# Patient Record
Sex: FEMALE | Race: BLACK OR AFRICAN AMERICAN | NOT HISPANIC OR LATINO | Employment: OTHER | URBAN - METROPOLITAN AREA
[De-identification: names, ages, dates, MRNs, and addresses within clinical notes are randomized per-mention and may not be internally consistent; named-entity substitution may affect disease eponyms.]

---

## 2023-01-27 ENCOUNTER — APPOINTMENT (OUTPATIENT)
Dept: PREADMISSION TESTING | Facility: HOSPITAL | Age: 63
End: 2023-01-27
Payer: COMMERCIAL

## 2023-01-27 VITALS — HEIGHT: 62 IN | WEIGHT: 148 LBS | BODY MASS INDEX: 27.23 KG/M2

## 2023-01-27 RX ORDER — AMLODIPINE BESYLATE 10 MG/1
10 TABLET ORAL DAILY
COMMUNITY
Start: 2022-12-16

## 2023-01-27 RX ORDER — BUDESONIDE AND FORMOTEROL FUMARATE DIHYDRATE 160; 4.5 UG/1; UG/1
2 AEROSOL RESPIRATORY (INHALATION) AS NEEDED
COMMUNITY
Start: 2022-05-13 | End: 2023-02-24 | Stop reason: ALTCHOICE

## 2023-01-27 RX ORDER — OXYCODONE AND ACETAMINOPHEN 5; 325 MG/1; MG/1
TABLET ORAL
COMMUNITY
Start: 2023-01-16 | End: 2023-03-11 | Stop reason: ENTERED-IN-ERROR

## 2023-01-27 RX ORDER — ATORVASTATIN CALCIUM 40 MG/1
TABLET, FILM COATED ORAL
COMMUNITY
Start: 2022-02-01 | End: 2023-02-24 | Stop reason: ALTCHOICE

## 2023-01-27 RX ORDER — SERTRALINE HYDROCHLORIDE 100 MG/1
100 TABLET, FILM COATED ORAL DAILY
COMMUNITY
End: 2024-01-02

## 2023-01-27 NOTE — PRE-PROCEDURE INSTRUCTIONS
1. We will call you between 2pm and 5pm on 2/6/23 to inform you of arrival time for your procedure. If you do not hear by 6PM, please call [phone] for arrival time.     2. Please arrive through the Main Entr[facility] of the [facility] (across from the parking garage) and report to the Surgery Registration Desk on the day of your procedure.    3. Please follow the following fasting guidelines:     No solid food EIGHT HOURS prior to surgery.  Unlimited clear liquids, meaning water or PLAIN black coffee WITHOUT any milk, cream, sugar, or sweetener are permitted up to TWO HOURS prior to arrival at the hospital.    4. Early on the morning of the procedure please take your usual dose of the listed medications with a sip of water:  Amlodipine  Atorvastatin  zoloft    Stop vitamins/supplements 1 week prior to surgery.    Avoid anti-inflammatories and aspirin products 1 week prior to surgery.  Tylenol is ok.    5. Other Instructions: You may brush your teeth the morning of the procedure. Rinse and spit, do not swallow.  Bring a list of your medications with dosages.  Use surgical wash as directed.     6. If you develop a cold, cough, fever, rash, or other symptom prior to the day of the procedure, please report it to your physician immediately.    7. If you need to cancel the procedure for any reason, please contact your physician.    8. Make arrangements to have someone drive you home from the procedure. If you have not arranged for transportation home, your surgery may be cancelled.     9. You may not take public transportation unless accompanied by a responsible person.    10. You may not drive a car or operate complex or potentially dangerous machinery for 24 hours following anesthesia and/or sedation.    11.  If it is medically necessary for you to have a longer stay, you will be informed as soon as the decision is made.    12. Only bring essential items to the hospital.  Do not wear or bring anything of value to the  hospital including jewelry of any kind, money, or wallet. Do not wear make-up or contact lenses.  DO NOT BRING MEDICATIONS FROM HOME unless instructed to do so. DO bring your hearing aids, glasses, and a case    13. No lotion, creams, powders, or oils on skin the morning of procedure     14. Dress in comfortable clothes.    15.  If instructed, please bring a copy of your Advanced Directive (Living Will/Durable Power of ) on the day of your procedure.     16. Patients need to quarantine from the time of PAT COVID test to day of surgery, regardless of COVID vaccine status.      17. Ensuring your safety at all times is a very important part of our [facility] Culture of Safety. After having surgery and sedation, you are at risk for falling and balance issues. Although you may feel awake, the effects of the medication can last up to 24 hours after anesthesia. If you need to use the bathroom during your recovery period, nursing staff will escort you there and stay with you to ensure your safety.    18. Refrain from drinking alcohol and smoking cigarettes for 24 hours prior to surgery.    19. Shower with antibacterial soap (Dial) the night before and morning of your procedure.  If your procedure indicates the need for CHG antiseptic wash (Bactoshield or Hibiclens), please use this instead and follow instructions as discussed at the time of your Pre-Admission Testing phone interview or visit.    Above instructions reviewed with patient and patient acknowledges understanding.  Fasting guidelines repeated x3 and read back by patient.  Pt verbalized understanding of stated guidelines.       Form explained by: Elyssa Royal RN

## 2023-02-02 ENCOUNTER — APPOINTMENT (OUTPATIENT)
Dept: PREADMISSION TESTING | Facility: HOSPITAL | Age: 63
End: 2023-02-02
Attending: SPECIALIST
Payer: COMMERCIAL

## 2023-02-02 ENCOUNTER — TRANSCRIBE ORDERS (OUTPATIENT)
Dept: REGISTRATION | Facility: HOSPITAL | Age: 63
End: 2023-02-02

## 2023-02-02 DIAGNOSIS — M51.16 INTERVERTEBRAL DISC DISORDERS WITH RADICULOPATHY, LUMBAR REGION: ICD-10-CM

## 2023-02-02 DIAGNOSIS — M51.16 INTERVERTEBRAL DISC DISORDERS WITH RADICULOPATHY, LUMBAR REGION: Primary | ICD-10-CM

## 2023-02-02 LAB — SARS-COV-2 RNA RESP QL NAA+PROBE: NEGATIVE

## 2023-02-02 PROCEDURE — U0003 INFECTIOUS AGENT DETECTION BY NUCLEIC ACID (DNA OR RNA); SEVERE ACUTE RESPIRATORY SYNDROME CORONAVIRUS 2 (SARS-COV-2) (CORONAVIRUS DISEASE [COVID-19]), AMPLIFIED PROBE TECHNIQUE, MAKING USE OF HIGH THROUGHPUT TECHNOLOGIES AS DESCRIBED BY CMS-2020-01-R: HCPCS

## 2023-02-02 PROCEDURE — C9803 HOPD COVID-19 SPEC COLLECT: HCPCS

## 2023-02-06 RX ORDER — CEFAZOLIN SODIUM/WATER 2 G/20 ML
2 SYRINGE (ML) INTRAVENOUS ONCE
Status: CANCELLED | OUTPATIENT
Start: 2023-02-06 | End: 2023-02-06

## 2023-02-06 RX ORDER — ACETAMINOPHEN 325 MG/1
975 TABLET ORAL ONCE
Status: CANCELLED | OUTPATIENT
Start: 2023-02-06 | End: 2023-02-06

## 2023-02-24 ENCOUNTER — APPOINTMENT (OUTPATIENT)
Dept: PREADMISSION TESTING | Facility: HOSPITAL | Age: 63
End: 2023-02-24
Payer: COMMERCIAL

## 2023-02-24 VITALS — HEIGHT: 60 IN | BODY MASS INDEX: 28.9 KG/M2

## 2023-02-24 RX ORDER — ROSUVASTATIN CALCIUM 40 MG/1
40 TABLET, COATED ORAL DAILY
COMMUNITY

## 2023-02-24 NOTE — PRE-PROCEDURE INSTRUCTIONS
1. We will call you between 3 pm and 7 pm on March 8th to inform you of arrival time for your procedure. If you do not hear by 6PM, please call [phone] arrival time.     2. Please arrive through the Main Entrance of the [facility] (across from the parking garage) and report to the Surgery Registration Desk on the day of your procedure.    3. Please follow the following fasting guidelines:     No solid food EIGHT HOURS prior to surgery.  Unlimited clear liquids, meaning water or PLAIN black coffee WITHOUT any milk, cream, sugar, or sweetener are permitted up to TWO HOURS prior to arrival at the hospital.    4. Early on the morning of the procedure please take your usual dose of the listed medications with a sip of water:    Amlodipine, Rosuvastatin, Sertraline      Stop all over the counter supplements, vitamins and Non-steriodal medications    Other Instructions: Do not take any mints, gum, hard candy, cough drops  or lozenges the morning of your procedure    5. Other Instructions: You may brush your teeth the morning of the procedure. Rinse and spit, do not swallow.  Bring a list of your medications with dosages.  Use surgical wash as directed.     6. If you develop a cold, cough, fever, rash, or other symptom prior to the day of the procedure, please report it to your physician immediately.    7. If you need to cancel the procedure for any reason, please contact your physician.    8. Make arrangements to have someone drive you home from the procedure. If you have not arranged for transportation home, your surgery may be cancelled.     9. You may not take public transportation unless accompanied by a responsible person.    10. You may not drive a car or operate complex or potentially dangerous machinery for 24 hours following anesthesia and/or sedation.    11.  If it is medically necessary for you to have a longer stay, you will be informed as soon as the decision is made.    12. Only bring essential items to  the hospital.  Do not wear or bring anything of value to the hospital including jewelry of any kind, money, or wallet. Do not wear make-up or contact lenses.  DO NOT BRING MEDICATIONS FROM HOME unless instructed to do so. DO bring your hearing aids, glasses, and a case    13. No lotion, creams, powders, or oils on skin the morning of procedure     14. Dress in comfortable clothes.    15.  If instructed, please bring a copy of your Advanced Directive (Living Will/Durable Power of ) on the day of your procedure.     16. Patients need to quarantine from the time of PAT COVID test to day of surgery, regardless of COVID vaccine status.      17. Ensuring your safety at all times is a very important part of our [facility] Culture of Safety. After having surgery and sedation, you are at risk for falling and balance issues. Although you may feel awake, the effects of the medication can last up to 24 hours after anesthesia. If you need to use the bathroom during your recovery period, nursing staff will escort you there and stay with you to ensure your safety.    18. Refrain from drinking alcohol and smoking cigarettes for 24 hours prior to surgery.    19. Shower with antibacterial soap (Dial) the night before and morning of your procedure.  If your procedure indicates the need for CHG antiseptic wash (Bactoshield or Hibiclens), please use this instead and follow instructions as discussed at the time of your Pre-Admission Testing phone interview or visit.    Above instructions reviewed with patient and patient acknowledges understanding.    Form explained by: My Hawkins RN

## 2023-03-06 ENCOUNTER — TRANSCRIBE ORDERS (OUTPATIENT)
Dept: REGISTRATION | Facility: HOSPITAL | Age: 63
End: 2023-03-06

## 2023-03-06 ENCOUNTER — APPOINTMENT (OUTPATIENT)
Dept: PREADMISSION TESTING | Facility: HOSPITAL | Age: 63
End: 2023-03-06
Attending: SPECIALIST
Payer: COMMERCIAL

## 2023-03-06 ENCOUNTER — APPOINTMENT (OUTPATIENT)
Dept: LAB | Facility: HOSPITAL | Age: 63
End: 2023-03-06
Attending: SPECIALIST
Payer: COMMERCIAL

## 2023-03-06 DIAGNOSIS — M51.16 INTERVERTEBRAL DISC DISORDERS WITH RADICULOPATHY, LUMBAR REGION: Primary | ICD-10-CM

## 2023-03-06 DIAGNOSIS — M51.16 INTERVERTEBRAL DISC DISORDERS WITH RADICULOPATHY, LUMBAR REGION: ICD-10-CM

## 2023-03-06 LAB
ALBUMIN SERPL-MCNC: 3.7 G/DL (ref 3.4–5)
ALP SERPL-CCNC: 72 IU/L (ref 35–126)
ALT SERPL-CCNC: 17 IU/L (ref 11–54)
ANION GAP SERPL CALC-SCNC: 8 MEQ/L (ref 3–15)
AST SERPL-CCNC: 20 IU/L (ref 15–41)
BILIRUB SERPL-MCNC: 0.8 MG/DL (ref 0.3–1.2)
BUN SERPL-MCNC: 9 MG/DL (ref 8–20)
CALCIUM SERPL-MCNC: 8.7 MG/DL (ref 8.9–10.3)
CHLORIDE SERPL-SCNC: 108 MEQ/L (ref 98–109)
CO2 SERPL-SCNC: 24 MEQ/L (ref 22–32)
CREAT SERPL-MCNC: 0.7 MG/DL (ref 0.6–1.1)
ERYTHROCYTE [DISTWIDTH] IN BLOOD BY AUTOMATED COUNT: 14.8 % (ref 11.7–14.4)
GFR SERPL CREATININE-BSD FRML MDRD: >60 ML/MIN/1.73M*2
GLUCOSE SERPL-MCNC: 85 MG/DL (ref 70–99)
HCT VFR BLDCO AUTO: 37.9 % (ref 35–45)
HGB BLD-MCNC: 12.2 G/DL (ref 11.8–15.7)
MCH RBC QN AUTO: 28.5 PG (ref 28–33.2)
MCHC RBC AUTO-ENTMCNC: 32.2 G/DL (ref 32.2–35.5)
MCV RBC AUTO: 88.6 FL (ref 83–98)
PDW BLD AUTO: 11.8 FL (ref 9.4–12.3)
PLATELET # BLD AUTO: 294 K/UL (ref 150–369)
POTASSIUM SERPL-SCNC: 4.4 MEQ/L (ref 3.6–5.1)
PROT SERPL-MCNC: 6.3 G/DL (ref 6–8.2)
RBC # BLD AUTO: 4.28 M/UL (ref 3.93–5.22)
SARS-COV-2 RNA RESP QL NAA+PROBE: NEGATIVE
SODIUM SERPL-SCNC: 140 MEQ/L (ref 136–144)
WBC # BLD AUTO: 4.5 K/UL (ref 3.8–10.5)

## 2023-03-06 PROCEDURE — 85027 COMPLETE CBC AUTOMATED: CPT

## 2023-03-06 PROCEDURE — U0003 INFECTIOUS AGENT DETECTION BY NUCLEIC ACID (DNA OR RNA); SEVERE ACUTE RESPIRATORY SYNDROME CORONAVIRUS 2 (SARS-COV-2) (CORONAVIRUS DISEASE [COVID-19]), AMPLIFIED PROBE TECHNIQUE, MAKING USE OF HIGH THROUGHPUT TECHNOLOGIES AS DESCRIBED BY CMS-2020-01-R: HCPCS

## 2023-03-06 PROCEDURE — C9803 HOPD COVID-19 SPEC COLLECT: HCPCS

## 2023-03-06 PROCEDURE — 80053 COMPREHEN METABOLIC PANEL: CPT

## 2023-03-06 PROCEDURE — 36415 COLL VENOUS BLD VENIPUNCTURE: CPT

## 2023-03-07 ENCOUNTER — ANESTHESIA EVENT (OUTPATIENT)
Dept: OPERATING ROOM | Facility: HOSPITAL | Age: 63
Setting detail: HOSPITAL OUTPATIENT SURGERY
End: 2023-03-07
Payer: COMMERCIAL

## 2023-03-07 NOTE — ANESTHESIOLOGIST PRE-PROCEDURE CHART REVIEW
I confirm that I have reviewed the available information in the medical record prior to the scheduled surgery. Clearance note reviewed.

## 2023-03-08 NOTE — ANESTHESIA PREPROCEDURE EVALUATION
PMH:   Past Medical History:   Diagnosis Date   • Anxiety    • Back pain    • COVID-19    • Depression    • Hypertension    • Lipid disorder    • Panic attacks         ROS/Med Hx:  HTN, HL  Depression  Anxiety  Current smoker  Back pain  Paresthesias radiating to bilateral LE  NPO >8 hours  >4 METS    EKG: NSR        PSH:   Past Surgical History:   Procedure Laterality Date   •  SECTION     • EPIDURAL STEROID INJECTION INTERLAMINAR THORACIC     • FRACTURE SURGERY Left     ankle ORIF   • ROTATOR CUFF REPAIR         Allergies:   Allergies   Allergen Reactions   • Trazodone    • Tree Nut        No current facility-administered medications on file prior to encounter.     No current outpatient medications on file prior to encounter.         CBC Results       23     1106    WBC 4.50    RBC 4.28    HGB 12.2    HCT 37.9    MCV 88.6    MCH 28.5    MCHC 32.2            BMP Results       23     1106        K 4.4    Cl 108    CO2 24    Glucose 85    BUN 9    Creatinine 0.7    Calcium 8.7    Anion Gap 8    EGFR >60.0                  Physical Exam    Airway   Mallampati: II   TM distance: >3 FB   Neck ROM: full  Cardiovascular - normal   Rhythm: regular   Rate: normalPulmonary - normal   clear to auscultation  Dental    Teeth Problems: partials        Anesthesia Plan    Plan: general    Technique: general endotracheal   2 ASA  Anesthetic plan and risks discussed with: patient

## 2023-03-09 ENCOUNTER — APPOINTMENT (OUTPATIENT)
Dept: RADIOLOGY | Facility: HOSPITAL | Age: 63
Setting detail: HOSPITAL OUTPATIENT SURGERY
End: 2023-03-09
Attending: SPECIALIST
Payer: COMMERCIAL

## 2023-03-09 ENCOUNTER — HOSPITAL ENCOUNTER (OUTPATIENT)
Facility: HOSPITAL | Age: 63
Setting detail: HOSPITAL OUTPATIENT SURGERY
Discharge: HOME | End: 2023-03-09
Attending: SPECIALIST | Admitting: SPECIALIST
Payer: COMMERCIAL

## 2023-03-09 ENCOUNTER — ANESTHESIA (OUTPATIENT)
Dept: OPERATING ROOM | Facility: HOSPITAL | Age: 63
Setting detail: HOSPITAL OUTPATIENT SURGERY
End: 2023-03-09
Payer: COMMERCIAL

## 2023-03-09 VITALS
RESPIRATION RATE: 18 BRPM | SYSTOLIC BLOOD PRESSURE: 137 MMHG | HEIGHT: 62 IN | OXYGEN SATURATION: 94 % | HEART RATE: 105 BPM | WEIGHT: 149 LBS | BODY MASS INDEX: 27.42 KG/M2 | DIASTOLIC BLOOD PRESSURE: 70 MMHG | TEMPERATURE: 97.1 F

## 2023-03-09 PROCEDURE — 25000000 HC PHARMACY GENERAL: Performed by: SPECIALIST

## 2023-03-09 PROCEDURE — 71000012 HC PACU PHASE 2 EA ADDL MIN: Performed by: SPECIALIST

## 2023-03-09 PROCEDURE — 27200000 HC STERILE SUPPLY: Performed by: SPECIALIST

## 2023-03-09 PROCEDURE — 36000004 HC OR LEVEL 4 INITIAL 30MIN: Performed by: SPECIALIST

## 2023-03-09 PROCEDURE — 25800000 HC PHARMACY IV SOLUTIONS: Performed by: SPECIALIST

## 2023-03-09 PROCEDURE — 0SB40ZZ EXCISION OF LUMBOSACRAL DISC, OPEN APPROACH: ICD-10-PCS | Performed by: SPECIALIST

## 2023-03-09 PROCEDURE — 71000002 HC PACU PHASE 2 INITIAL 30MIN: Performed by: SPECIALIST

## 2023-03-09 PROCEDURE — 25000000 HC PHARMACY GENERAL: Performed by: NURSE ANESTHETIST, CERTIFIED REGISTERED

## 2023-03-09 PROCEDURE — 63600000 HC DRUGS/DETAIL CODE: Performed by: NURSE ANESTHETIST, CERTIFIED REGISTERED

## 2023-03-09 PROCEDURE — 63600000 HC DRUGS/DETAIL CODE: Performed by: STUDENT IN AN ORGANIZED HEALTH CARE EDUCATION/TRAINING PROGRAM

## 2023-03-09 PROCEDURE — 63600000 HC DRUGS/DETAIL CODE: Performed by: SPECIALIST

## 2023-03-09 PROCEDURE — 63700000 HC SELF-ADMINISTRABLE DRUG: Performed by: NURSE ANESTHETIST, CERTIFIED REGISTERED

## 2023-03-09 PROCEDURE — 37000001 HC ANESTHESIA GENERAL: Performed by: SPECIALIST

## 2023-03-09 PROCEDURE — 71000001 HC PACU PHASE 1 INITIAL 30MIN: Performed by: SPECIALIST

## 2023-03-09 PROCEDURE — 63700000 HC SELF-ADMINISTRABLE DRUG: Performed by: SPECIALIST

## 2023-03-09 PROCEDURE — 72100 X-RAY EXAM L-S SPINE 2/3 VWS: CPT

## 2023-03-09 PROCEDURE — 71000011 HC PACU PHASE 1 EA ADDL MIN: Performed by: SPECIALIST

## 2023-03-09 PROCEDURE — 36000014 HC OR LEVEL 4 EA ADDL MIN: Performed by: SPECIALIST

## 2023-03-09 RX ORDER — PHENYLEPHRINE HYDROCHLORIDE 10 MG/ML
INJECTION INTRAVENOUS AS NEEDED
Status: DISCONTINUED | OUTPATIENT
Start: 2023-03-09 | End: 2023-03-09 | Stop reason: SURG

## 2023-03-09 RX ORDER — DEXTROSE 40 %
15-30 GEL (GRAM) ORAL AS NEEDED
Status: DISCONTINUED | OUTPATIENT
Start: 2023-03-09 | End: 2023-03-09 | Stop reason: HOSPADM

## 2023-03-09 RX ORDER — CEFAZOLIN SODIUM/WATER 2 G/20 ML
2 SYRINGE (ML) INTRAVENOUS
Status: COMPLETED | OUTPATIENT
Start: 2023-03-09 | End: 2023-03-09

## 2023-03-09 RX ORDER — FENTANYL CITRATE 50 UG/ML
50 INJECTION, SOLUTION INTRAMUSCULAR; INTRAVENOUS EVERY 5 MIN PRN
Status: COMPLETED | OUTPATIENT
Start: 2023-03-09 | End: 2023-03-09

## 2023-03-09 RX ORDER — ONDANSETRON HYDROCHLORIDE 2 MG/ML
4 INJECTION, SOLUTION INTRAVENOUS
Status: DISCONTINUED | OUTPATIENT
Start: 2023-03-09 | End: 2023-03-09 | Stop reason: HOSPADM

## 2023-03-09 RX ORDER — HYDROMORPHONE HYDROCHLORIDE 1 MG/ML
0.5 INJECTION, SOLUTION INTRAMUSCULAR; INTRAVENOUS; SUBCUTANEOUS
Status: DISCONTINUED | OUTPATIENT
Start: 2023-03-09 | End: 2023-03-09 | Stop reason: HOSPADM

## 2023-03-09 RX ORDER — PROPOFOL 10 MG/ML
INJECTION, EMULSION INTRAVENOUS AS NEEDED
Status: DISCONTINUED | OUTPATIENT
Start: 2023-03-09 | End: 2023-03-09 | Stop reason: SURG

## 2023-03-09 RX ORDER — LIDOCAINE HYDROCHLORIDE 10 MG/ML
INJECTION, SOLUTION INFILTRATION; PERINEURAL AS NEEDED
Status: DISCONTINUED | OUTPATIENT
Start: 2023-03-09 | End: 2023-03-09 | Stop reason: SURG

## 2023-03-09 RX ORDER — ACETAMINOPHEN 325 MG/1
650 TABLET ORAL ONCE AS NEEDED
Status: DISCONTINUED | OUTPATIENT
Start: 2023-03-09 | End: 2023-03-09 | Stop reason: HOSPADM

## 2023-03-09 RX ORDER — IBUPROFEN 200 MG
16-32 TABLET ORAL AS NEEDED
Status: DISCONTINUED | OUTPATIENT
Start: 2023-03-09 | End: 2023-03-09 | Stop reason: HOSPADM

## 2023-03-09 RX ORDER — DEXAMETHASONE SODIUM PHOSPHATE 4 MG/ML
INJECTION, SOLUTION INTRA-ARTICULAR; INTRALESIONAL; INTRAMUSCULAR; INTRAVENOUS; SOFT TISSUE AS NEEDED
Status: DISCONTINUED | OUTPATIENT
Start: 2023-03-09 | End: 2023-03-09 | Stop reason: SURG

## 2023-03-09 RX ORDER — ROCURONIUM BROMIDE 10 MG/ML
INJECTION, SOLUTION INTRAVENOUS AS NEEDED
Status: DISCONTINUED | OUTPATIENT
Start: 2023-03-09 | End: 2023-03-09 | Stop reason: SURG

## 2023-03-09 RX ORDER — DEXTROSE 50 % IN WATER (D50W) INTRAVENOUS SYRINGE
25 AS NEEDED
Status: DISCONTINUED | OUTPATIENT
Start: 2023-03-09 | End: 2023-03-09 | Stop reason: HOSPADM

## 2023-03-09 RX ORDER — FENTANYL CITRATE 50 UG/ML
INJECTION, SOLUTION INTRAMUSCULAR; INTRAVENOUS AS NEEDED
Status: DISCONTINUED | OUTPATIENT
Start: 2023-03-09 | End: 2023-03-09 | Stop reason: SURG

## 2023-03-09 RX ORDER — OXYCODONE HYDROCHLORIDE 5 MG/1
10 TABLET ORAL EVERY 4 HOURS PRN
Status: DISCONTINUED | OUTPATIENT
Start: 2023-03-09 | End: 2023-03-10 | Stop reason: HOSPADM

## 2023-03-09 RX ORDER — ALBUTEROL SULFATE 90 UG/1
INHALANT RESPIRATORY (INHALATION) AS NEEDED
Status: DISCONTINUED | OUTPATIENT
Start: 2023-03-09 | End: 2023-03-09 | Stop reason: SURG

## 2023-03-09 RX ORDER — ONDANSETRON HYDROCHLORIDE 2 MG/ML
INJECTION, SOLUTION INTRAVENOUS AS NEEDED
Status: DISCONTINUED | OUTPATIENT
Start: 2023-03-09 | End: 2023-03-09 | Stop reason: SURG

## 2023-03-09 RX ORDER — KETOROLAC TROMETHAMINE 30 MG/ML
INJECTION, SOLUTION INTRAMUSCULAR; INTRAVENOUS AS NEEDED
Status: DISCONTINUED | OUTPATIENT
Start: 2023-03-09 | End: 2023-03-09 | Stop reason: SURG

## 2023-03-09 RX ORDER — SODIUM CHLORIDE 9 MG/ML
INJECTION, SOLUTION INTRAVENOUS CONTINUOUS
Status: DISCONTINUED | OUTPATIENT
Start: 2023-03-09 | End: 2023-03-10 | Stop reason: HOSPADM

## 2023-03-09 RX ORDER — BUPIVACAINE HYDROCHLORIDE 5 MG/ML
INJECTION, SOLUTION EPIDURAL; INTRACAUDAL
Status: DISCONTINUED | OUTPATIENT
Start: 2023-03-09 | End: 2023-03-09 | Stop reason: HOSPADM

## 2023-03-09 RX ORDER — MIDAZOLAM HYDROCHLORIDE 2 MG/2ML
INJECTION, SOLUTION INTRAMUSCULAR; INTRAVENOUS AS NEEDED
Status: DISCONTINUED | OUTPATIENT
Start: 2023-03-09 | End: 2023-03-09 | Stop reason: SURG

## 2023-03-09 RX ORDER — ACETAMINOPHEN 325 MG/1
975 TABLET ORAL
Status: COMPLETED | OUTPATIENT
Start: 2023-03-09 | End: 2023-03-09

## 2023-03-09 RX ORDER — PHENYLEPHRINE HCL IN 0.9% NACL 50MG/250ML
10-400 PLASTIC BAG, INJECTION (ML) INTRAVENOUS
Status: DISPENSED | OUTPATIENT
Start: 2023-03-09 | End: 2023-03-09

## 2023-03-09 RX ORDER — HYDROMORPHONE HYDROCHLORIDE 1 MG/ML
INJECTION, SOLUTION INTRAMUSCULAR; INTRAVENOUS; SUBCUTANEOUS AS NEEDED
Status: DISCONTINUED | OUTPATIENT
Start: 2023-03-09 | End: 2023-03-09 | Stop reason: SURG

## 2023-03-09 RX ADMIN — FENTANYL CITRATE 50 MCG: 50 INJECTION, SOLUTION INTRAMUSCULAR; INTRAVENOUS at 12:32

## 2023-03-09 RX ADMIN — DEXAMETHASONE SODIUM PHOSPHATE 4 MG: 4 INJECTION, SOLUTION INTRA-ARTICULAR; INTRALESIONAL; INTRAMUSCULAR; INTRAVENOUS; SOFT TISSUE at 12:39

## 2023-03-09 RX ADMIN — ALBUTEROL SULFATE 2 PUFF: 90 AEROSOL, METERED RESPIRATORY (INHALATION) at 12:36

## 2023-03-09 RX ADMIN — FENTANYL CITRATE 50 MCG: 50 INJECTION, SOLUTION INTRAMUSCULAR; INTRAVENOUS at 14:46

## 2023-03-09 RX ADMIN — SUGAMMADEX 135.2 MG: 100 INJECTION, SOLUTION INTRAVENOUS at 13:57

## 2023-03-09 RX ADMIN — LIDOCAINE HYDROCHLORIDE 5 ML: 10 INJECTION, SOLUTION INFILTRATION; PERINEURAL at 12:32

## 2023-03-09 RX ADMIN — ACETAMINOPHEN 975 MG: 325 TABLET, FILM COATED ORAL at 11:14

## 2023-03-09 RX ADMIN — OXYCODONE HYDROCHLORIDE 10 MG: 5 TABLET ORAL at 16:07

## 2023-03-09 RX ADMIN — ROCURONIUM BROMIDE 10 MG: 10 SOLUTION INTRAVENOUS at 13:32

## 2023-03-09 RX ADMIN — FENTANYL CITRATE 50 MCG: 50 INJECTION, SOLUTION INTRAMUSCULAR; INTRAVENOUS at 12:40

## 2023-03-09 RX ADMIN — PROPOFOL 50 MG: 10 INJECTION, EMULSION INTRAVENOUS at 12:36

## 2023-03-09 RX ADMIN — FENTANYL CITRATE 50 MCG: 50 INJECTION, SOLUTION INTRAMUSCULAR; INTRAVENOUS at 14:42

## 2023-03-09 RX ADMIN — TRANEXAMIC ACID 1400 MG: 100 INJECTION, SOLUTION INTRAVENOUS at 13:00

## 2023-03-09 RX ADMIN — ONDANSETRON 4 MG: 2 INJECTION INTRAMUSCULAR; INTRAVENOUS at 13:50

## 2023-03-09 RX ADMIN — PROPOFOL 150 MG: 10 INJECTION, EMULSION INTRAVENOUS at 12:33

## 2023-03-09 RX ADMIN — KETOROLAC TROMETHAMINE 30 MG: 30 INJECTION, SOLUTION INTRAMUSCULAR; INTRAVENOUS at 13:50

## 2023-03-09 RX ADMIN — PHENYLEPHRINE HYDROCHLORIDE 100 MCG: 10 INJECTION INTRAVENOUS at 12:40

## 2023-03-09 RX ADMIN — FENTANYL CITRATE 50 MCG: 50 INJECTION, SOLUTION INTRAMUSCULAR; INTRAVENOUS at 13:59

## 2023-03-09 RX ADMIN — SODIUM CHLORIDE 40 ML/HR: 9 INJECTION, SOLUTION INTRAVENOUS at 11:31

## 2023-03-09 RX ADMIN — PHENYLEPHRINE HYDROCHLORIDE 100 MCG: 10 INJECTION INTRAVENOUS at 12:46

## 2023-03-09 RX ADMIN — PHENYLEPHRINE HYDROCHLORIDE 100 MCG: 10 INJECTION INTRAVENOUS at 13:40

## 2023-03-09 RX ADMIN — CEFAZOLIN 2 G: 330 INJECTION, POWDER, FOR SOLUTION INTRAMUSCULAR; INTRAVENOUS at 12:47

## 2023-03-09 RX ADMIN — FENTANYL CITRATE 50 MCG: 50 INJECTION, SOLUTION INTRAMUSCULAR; INTRAVENOUS at 13:58

## 2023-03-09 RX ADMIN — ROCURONIUM BROMIDE 40 MG: 10 SOLUTION INTRAVENOUS at 12:33

## 2023-03-09 RX ADMIN — HYDROMORPHONE HYDROCHLORIDE 1 MG: 1 INJECTION, SOLUTION INTRAMUSCULAR; INTRAVENOUS; SUBCUTANEOUS at 13:15

## 2023-03-09 RX ADMIN — MIDAZOLAM 2 MG: 1 INJECTION INTRAMUSCULAR; INTRAVENOUS at 12:28

## 2023-03-09 ASSESSMENT — PAIN SCALES - GENERAL: PAIN_LEVEL: 2

## 2023-03-09 NOTE — BRIEF OP NOTE
RIGHT L5-S1 DISCECTOMY (R) Procedure Note    Procedure:    RIGHT L5-S1 DISCECTOMY  CPT(R) Code:  95268 - OR LAMNOTMY INCL W/DCMPRSN NRV ROOT 1 INTRSPC LUMBR      Pre-op Diagnosis     * Lumbar disc herniation with radiculopathy [M51.16]       Post-op Diagnosis     * Lumbar disc herniation with radiculopathy [M51.16]    Surgeon(s) and Role:     * Lyle Traore Jr., MD - Primary    Anesthesia: General    Staff:   Circulator: Emilie Anglin RN  Scrub Person: Gerardo Parish RN  Registered Nurse First Assistant: Maritza Palma RN    Procedure Details   Right L5-S1 discectomy    Estimated Blood Loss: 25 cc    Specimens:                No specimens collected during this procedure.      Drains: * No LDAs found *    Implants: * No implants in log *           Complications:  None; patient tolerated the procedure well.           Disposition: PACU - hemodynamically stable.           Condition: stable    Lyle Traore Jr., MD  Phone Number: [phone]

## 2023-03-09 NOTE — ANESTHESIA POSTPROCEDURE EVALUATION
Patient: Anna Pruett    Procedure Summary     Date: 03/09/23 Room / Location:  OR 6 / PH OR    Anesthesia Start: 1225 Anesthesia Stop: 1430    Procedure: RIGHT L5-S1 DISCECTOMY (Right: Back) Diagnosis:       Lumbar disc herniation with radiculopathy      (Lumbar Disc Herniation, Radiculopathy M51.16)    Surgeons: Lyle Traore Jr., MD Responsible Provider: Kristina Fournier MD    Anesthesia Type: general ASA Status: 2          Anesthesia Type: general  PACU Vitals  3/9/2023 1423 - 3/9/2023 1523      3/9/2023  1432 3/9/2023  1445 3/9/2023  1446 3/9/2023  1500    BP: 141/65 174/73 174/73 143/67    Temp: 36.1 °C (97 °F) -- -- --    Pulse: 103 108 108 90    Resp: 22 20 24 12    SpO2: 99 % 97 % 97 % 96 %              3/9/2023  1515             BP: 117/70       Temp: 36.2 °C (97.1 °F)       Pulse: 96       Resp: 18       SpO2: 93 %               Anesthesia Post Evaluation    Pain score: 2  Pain management: adequate  Patient location during evaluation: PACU  Patient participation: complete - patient participated  Level of consciousness: awake and alert  Cardiovascular status: acceptable  Airway Patency: adequate  Respiratory status: acceptable  Hydration status: acceptable  Anesthetic complications: no

## 2023-03-09 NOTE — ANESTHESIA PROCEDURE NOTES
Airway  Urgency: elective    Start Time: 3/9/2023 12:35 PM    General Information and Staff    Patient location during procedure: OR  Anesthesiologist: Kristina Fournier MD  Resident/CRNA: Radha Pollard CRNA  Performed: resident/CRNA   Performed by: Radha Pollard CRNA  Authorized by: Kristina Fournier MD      Indications and Patient Condition  Indications for airway management: anesthesia  Sedation level: deep  Preoxygenated: yes  Patient position: sniffing  Mask difficulty assessment: 1 - vent by mask    Final Airway Details  Final airway type: endotracheal airway      Successful airway: ETT     Successful intubation technique: direct laryngoscopy  Facilitating devices/methods: intubating stylet  Endotracheal tube insertion site: oral  Blade: Richard  Blade size: #3  ETT size (mm): 7.0  Cormack-Lehane Classification: grade IIa - partial view of glottis  Placement verified by: chest auscultation and capnometry   Measured from: lips  ETT to lips (cm): 22  Number of attempts at approach: 1  Number of other approaches attempted: 0  Atraumatic airway insertion

## 2023-03-09 NOTE — OR SURGEON
Pre-Procedure patient identification:  I am the primary operating surgeon/proceduralist and I have identified the patient and confirmed laterality is right on 03/09/23 at 12:09 PM Lyle Traore Jr., MD  Phone Number: [phone]

## 2023-03-09 NOTE — ANESTHESIOLOGIST PRE-PROCEDURE ATTESTATION
Pre-Procedure Patient Identification:  I am the Primary Anesthesiologist and have identified the patient on 03/09/23 at 11:05 AM.   I have confirmed the procedure(s) will be performed by the following surgeon/proceduralist Lyle Traore Jr., MD.

## 2023-03-09 NOTE — DISCHARGE INSTRUCTIONS
Gradually resume walking as tolerated.    No driving for 2 weeks    Avoid bending or lifting or straining    You may shower and pat dry the dressing.    In 5 days you may remove the dressing and shower and pat dry the surgical glue over the skin.  There are dissolvable stitches below the skin which do not need to be removed.    If there is drainage or redness or significant pain please call the office    You may resume your medications.  Oxycodone and Toradol have been sent to the pharmacy    You may take 1 or 2 of the oxycodone every 4-6 hours as needed for severe pain  The Toradol is an anti-inflammatory pain medication that may be taken 1 every 6 hours with food.    Please do not smoke    Please call the office to schedule a follow-up appointment in 2 weeks    [phone]  
Yes

## 2023-03-09 NOTE — OP NOTE
Preoperative diagnosis: L5-S1 disc herniation right radiculopathy    Postoperative diagnosis is the same    Procedure right L5-S1 discectomy    Surgeon Lyle Traore Jr, MD    Anesthesia General    Blood loss 25 cc    Specimens none    Complications none    Findings L5-S1 disc herniation    Indications for procedure: She is a 63-year-old with back pain radiating to her right leg.  She has failed several months of conservative care MRI did identify a disc herniation L5-S1 impinging the right S1 nerve root consistent with a pain pattern.  She wished to proceed with operative decompression full understanding the natural history discriminations and outcomes and risks of conservative care versus the outcomes and risks of operative decompression.    Procedure: She was brought to the operating room and the operative site was marked and confirmed a timeout.  Her imaging studies reviewed and she did receive preoperative antibiotics.  Once general anesthesia was obtained she was very care physician prone on the Dean frame of the Lamonte table.  Her head shoulders neck and elbows were appropriate position.  Her breasts were tucked into the frame there was no pressure on her knees.  The C-arm was used identify the L5-S1 segment and this is confirmed by independent radiology review.  Her back was prepped and draped in a sterile manner.    A midline incision was made over L5-S1.  Is brought through the fascia this was incised and the soft tissue was elevated over the spinous process over the lamina facet joint L5-S1 this is confirmed by intraoperative fluoroscopy.  A smooth ball-tipped probe was placed under the lamina of S1 and under the lamina of L5.  A small laminotomy was completed ligamentum flavum was elevated and this was removed.  With the nerve root protected the medial facetectomy was completed.  The S1 nerve root was identified and this was retracted medially over a disc herniation this was identified.  The annulus  was opened and a large fragment was removed from under the nerve root.  Several small fragments were removed and the space was explored with no remaining loose pieces.  Multiple irrigations were completed.  Hemostasis was confirmed the spinal canal with bipolar cautery as well as Gelfoam thrombin.  With the canal packed in the dura covered Marcaine placed paraspinal muscles.  Additional irrigations were completed the dura was inspected and this was intact there was no bleeding.  The fascia was then closed followed by the deep and superficial subcutaneous tissue.  A subcuticular stitch placed followed by Dermabond and sterile dressings.  Throughout the procedure the retractor was frequently released and multiple irrigations were completed.    She was awakened and tolerated the procedure well without complication

## 2023-03-11 ENCOUNTER — APPOINTMENT (EMERGENCY)
Dept: RADIOLOGY | Facility: HOSPITAL | Age: 63
End: 2023-03-11
Attending: PHYSICAL THERAPIST
Payer: COMMERCIAL

## 2023-03-11 ENCOUNTER — HOSPITAL ENCOUNTER (INPATIENT)
Facility: HOSPITAL | Age: 63
LOS: 3 days | Discharge: HOME HEALTH | End: 2023-03-14
Attending: EMERGENCY MEDICINE | Admitting: SPECIALIST
Payer: COMMERCIAL

## 2023-03-11 DIAGNOSIS — G89.18 POSTOPERATIVE BACK PAIN: Primary | ICD-10-CM

## 2023-03-11 DIAGNOSIS — M54.9 POSTOPERATIVE BACK PAIN: Primary | ICD-10-CM

## 2023-03-11 LAB
ALBUMIN SERPL-MCNC: 3.6 G/DL (ref 3.4–5)
ALP SERPL-CCNC: 67 IU/L (ref 35–126)
ALT SERPL-CCNC: 19 IU/L (ref 11–54)
ANION GAP SERPL CALC-SCNC: 4 MEQ/L (ref 3–15)
AST SERPL-CCNC: 28 IU/L (ref 15–41)
BASOPHILS # BLD: 0 K/UL (ref 0.01–0.1)
BASOPHILS NFR BLD: 0 %
BILIRUB SERPL-MCNC: 0.6 MG/DL (ref 0.3–1.2)
BILIRUB UR QL STRIP.AUTO: NEGATIVE MG/DL
BUN SERPL-MCNC: 10 MG/DL (ref 8–20)
CALCIUM SERPL-MCNC: 8.7 MG/DL (ref 8.9–10.3)
CHLORIDE SERPL-SCNC: 112 MEQ/L (ref 98–109)
CLARITY UR REFRACT.AUTO: CLEAR
CO2 SERPL-SCNC: 25 MEQ/L (ref 22–32)
COLOR UR AUTO: COLORLESS
CREAT SERPL-MCNC: 0.6 MG/DL (ref 0.6–1.1)
CRP SERPL-MCNC: 9.8 MG/L
DIFFERENTIAL METHOD BLD: ABNORMAL
EOSINOPHIL # BLD: 0.46 K/UL (ref 0.04–0.36)
EOSINOPHIL NFR BLD: 5 %
ERYTHROCYTE [DISTWIDTH] IN BLOOD BY AUTOMATED COUNT: 14.9 % (ref 11.7–14.4)
ERYTHROCYTE [SEDIMENTATION RATE] IN BLOOD BY WESTERGREN METHOD: 54 MM/HR
GFR SERPL CREATININE-BSD FRML MDRD: >60 ML/MIN/1.73M*2
GLUCOSE SERPL-MCNC: 87 MG/DL (ref 70–99)
GLUCOSE UR STRIP.AUTO-MCNC: NEGATIVE MG/DL
HCT VFR BLDCO AUTO: 37.6 % (ref 35–45)
HGB BLD-MCNC: 12.2 G/DL (ref 11.8–15.7)
HGB UR QL STRIP.AUTO: NEGATIVE
KETONES UR STRIP.AUTO-MCNC: NEGATIVE MG/DL
LEUKOCYTE ESTERASE UR QL STRIP.AUTO: NEGATIVE
LYMPHOCYTES # BLD: 1.86 K/UL (ref 1.2–3.5)
LYMPHOCYTES NFR BLD: 20 %
MCH RBC QN AUTO: 28.6 PG (ref 28–33.2)
MCHC RBC AUTO-ENTMCNC: 32.4 G/DL (ref 32.2–35.5)
MCV RBC AUTO: 88.3 FL (ref 83–98)
MONOCYTES # BLD: 0.84 K/UL (ref 0.28–0.8)
MONOCYTES NFR BLD: 9 %
NEUTS BAND # BLD: 5.95 K/UL (ref 1.7–7)
NEUTS SEG NFR BLD: 64 %
NITRITE UR QL STRIP.AUTO: NEGATIVE
PDW BLD AUTO: 11.2 FL (ref 9.4–12.3)
PH UR STRIP.AUTO: 7 [PH]
PLAT MORPH BLD: NORMAL
PLATELET # BLD AUTO: 291 K/UL (ref 150–369)
PLATELET # BLD EST: ABNORMAL 10*3/UL
POTASSIUM SERPL-SCNC: 3.8 MEQ/L (ref 3.6–5.1)
PROT SERPL-MCNC: 7.1 G/DL (ref 6–8.2)
PROT UR QL STRIP.AUTO: NEGATIVE
RBC # BLD AUTO: 4.26 M/UL (ref 3.93–5.22)
RBC MORPH BLD: NORMAL
SARS-COV-2 RNA RESP QL NAA+PROBE: NEGATIVE
SODIUM SERPL-SCNC: 141 MEQ/L (ref 136–144)
SP GR UR REFRACT.AUTO: 1.01
UROBILINOGEN UR STRIP-ACNC: 0.2 EU/DL
VARIANT LYMPHS # BLD MANUAL: 0.19 K/UL
VARIANT LYMPHS NFR BLD: 2 %
WBC # BLD AUTO: 9.29 K/UL (ref 3.8–10.5)

## 2023-03-11 PROCEDURE — 96375 TX/PRO/DX INJ NEW DRUG ADDON: CPT

## 2023-03-11 PROCEDURE — 85652 RBC SED RATE AUTOMATED: CPT | Performed by: PHYSICIAN ASSISTANT

## 2023-03-11 PROCEDURE — U0003 INFECTIOUS AGENT DETECTION BY NUCLEIC ACID (DNA OR RNA); SEVERE ACUTE RESPIRATORY SYNDROME CORONAVIRUS 2 (SARS-COV-2) (CORONAVIRUS DISEASE [COVID-19]), AMPLIFIED PROBE TECHNIQUE, MAKING USE OF HIGH THROUGHPUT TECHNOLOGIES AS DESCRIBED BY CMS-2020-01-R: HCPCS | Performed by: PHYSICIAN ASSISTANT

## 2023-03-11 PROCEDURE — 72100 X-RAY EXAM L-S SPINE 2/3 VWS: CPT

## 2023-03-11 PROCEDURE — 36415 COLL VENOUS BLD VENIPUNCTURE: CPT | Performed by: EMERGENCY MEDICINE

## 2023-03-11 PROCEDURE — 96374 THER/PROPH/DIAG INJ IV PUSH: CPT

## 2023-03-11 PROCEDURE — 12000000 HC ROOM AND CARE MED/SURG

## 2023-03-11 PROCEDURE — 85025 COMPLETE CBC W/AUTO DIFF WBC: CPT | Performed by: PHYSICIAN ASSISTANT

## 2023-03-11 PROCEDURE — 63600000 HC DRUGS/DETAIL CODE: Performed by: PHYSICIAN ASSISTANT

## 2023-03-11 PROCEDURE — 81003 URINALYSIS AUTO W/O SCOPE: CPT | Performed by: PHYSICIAN ASSISTANT

## 2023-03-11 PROCEDURE — 99285 EMERGENCY DEPT VISIT HI MDM: CPT | Mod: 25

## 2023-03-11 PROCEDURE — 63700000 HC SELF-ADMINISTRABLE DRUG: Performed by: PHYSICAL THERAPIST

## 2023-03-11 PROCEDURE — 96376 TX/PRO/DX INJ SAME DRUG ADON: CPT

## 2023-03-11 PROCEDURE — 63600000 HC DRUGS/DETAIL CODE: Performed by: PHYSICAL THERAPIST

## 2023-03-11 PROCEDURE — 86140 C-REACTIVE PROTEIN: CPT | Performed by: PHYSICIAN ASSISTANT

## 2023-03-11 PROCEDURE — 80053 COMPREHEN METABOLIC PANEL: CPT | Performed by: PHYSICIAN ASSISTANT

## 2023-03-11 RX ORDER — SERTRALINE HYDROCHLORIDE 50 MG/1
100 TABLET, FILM COATED ORAL DAILY
Status: DISCONTINUED | OUTPATIENT
Start: 2023-03-12 | End: 2023-03-11

## 2023-03-11 RX ORDER — OXYCODONE HYDROCHLORIDE 5 MG/1
5 TABLET ORAL EVERY 4 HOURS PRN
Status: DISCONTINUED | OUTPATIENT
Start: 2023-03-11 | End: 2023-03-13

## 2023-03-11 RX ORDER — AMLODIPINE BESYLATE 10 MG/1
10 TABLET ORAL DAILY
Status: DISCONTINUED | OUTPATIENT
Start: 2023-03-12 | End: 2023-03-14 | Stop reason: HOSPADM

## 2023-03-11 RX ORDER — KETOROLAC TROMETHAMINE 10 MG/1
10 TABLET, FILM COATED ORAL EVERY 6 HOURS PRN
COMMUNITY
End: 2024-01-02

## 2023-03-11 RX ORDER — OXYCODONE HYDROCHLORIDE 5 MG/1
5 TABLET ORAL ONCE
Status: COMPLETED | OUTPATIENT
Start: 2023-03-11 | End: 2023-03-11

## 2023-03-11 RX ORDER — DEXAMETHASONE SODIUM PHOSPHATE 4 MG/ML
10 INJECTION, SOLUTION INTRA-ARTICULAR; INTRALESIONAL; INTRAMUSCULAR; INTRAVENOUS; SOFT TISSUE ONCE
Status: COMPLETED | OUTPATIENT
Start: 2023-03-11 | End: 2023-03-11

## 2023-03-11 RX ORDER — KETOROLAC TROMETHAMINE 15 MG/ML
15 INJECTION, SOLUTION INTRAMUSCULAR; INTRAVENOUS EVERY 6 HOURS PRN
Status: DISPENSED | OUTPATIENT
Start: 2023-03-11 | End: 2023-03-13

## 2023-03-11 RX ORDER — MORPHINE SULFATE 4 MG/ML
4 INJECTION, SOLUTION INTRAMUSCULAR; INTRAVENOUS ONCE
Status: COMPLETED | OUTPATIENT
Start: 2023-03-11 | End: 2023-03-11

## 2023-03-11 RX ORDER — ONDANSETRON HYDROCHLORIDE 2 MG/ML
4 INJECTION, SOLUTION INTRAVENOUS ONCE
Status: COMPLETED | OUTPATIENT
Start: 2023-03-11 | End: 2023-03-11

## 2023-03-11 RX ORDER — ROSUVASTATIN CALCIUM 40 MG/1
40 TABLET, COATED ORAL
Status: DISCONTINUED | OUTPATIENT
Start: 2023-03-11 | End: 2023-03-14 | Stop reason: HOSPADM

## 2023-03-11 RX ORDER — MIRTAZAPINE 30 MG/1
30 TABLET, FILM COATED ORAL NIGHTLY
Status: DISCONTINUED | OUTPATIENT
Start: 2023-03-11 | End: 2023-03-11

## 2023-03-11 RX ORDER — MIRTAZAPINE 15 MG/1
30 TABLET, FILM COATED ORAL NIGHTLY PRN
Status: DISCONTINUED | OUTPATIENT
Start: 2023-03-11 | End: 2023-03-14 | Stop reason: HOSPADM

## 2023-03-11 RX ORDER — DEXAMETHASONE SODIUM PHOSPHATE 4 MG/ML
10 INJECTION, SOLUTION INTRA-ARTICULAR; INTRALESIONAL; INTRAMUSCULAR; INTRAVENOUS; SOFT TISSUE
Status: COMPLETED | OUTPATIENT
Start: 2023-03-12 | End: 2023-03-12

## 2023-03-11 RX ORDER — LORAZEPAM 1 MG/1
1 TABLET ORAL EVERY 6 HOURS PRN
Status: DISCONTINUED | OUTPATIENT
Start: 2023-03-11 | End: 2023-03-14 | Stop reason: HOSPADM

## 2023-03-11 RX ORDER — MIRTAZAPINE 30 MG/1
30 TABLET, FILM COATED ORAL NIGHTLY
COMMUNITY
End: 2024-01-02

## 2023-03-11 RX ORDER — ACETAMINOPHEN 325 MG/1
650 TABLET ORAL EVERY 6 HOURS
Status: DISCONTINUED | OUTPATIENT
Start: 2023-03-11 | End: 2023-03-14 | Stop reason: HOSPADM

## 2023-03-11 RX ORDER — OXYCODONE HYDROCHLORIDE 5 MG/1
5 TABLET ORAL EVERY 4 HOURS PRN
Status: ON HOLD | COMMUNITY
End: 2023-03-13 | Stop reason: SDUPTHER

## 2023-03-11 RX ORDER — LORAZEPAM 1 MG/1
1 TABLET ORAL EVERY 6 HOURS PRN
Status: ON HOLD | COMMUNITY
End: 2023-03-13 | Stop reason: SDUPTHER

## 2023-03-11 RX ADMIN — OXYCODONE HYDROCHLORIDE 5 MG: 5 TABLET ORAL at 21:40

## 2023-03-11 RX ADMIN — DEXAMETHASONE SODIUM PHOSPHATE 10 MG: 4 INJECTION, SOLUTION INTRA-ARTICULAR; INTRALESIONAL; INTRAMUSCULAR; INTRAVENOUS; SOFT TISSUE at 16:57

## 2023-03-11 RX ADMIN — KETOROLAC TROMETHAMINE 15 MG: 15 INJECTION, SOLUTION INTRAMUSCULAR; INTRAVENOUS at 22:38

## 2023-03-11 RX ADMIN — ACETAMINOPHEN 650 MG: 325 TABLET ORAL at 21:40

## 2023-03-11 RX ADMIN — ROSUVASTATIN CALCIUM 40 MG: 40 TABLET, COATED ORAL at 21:40

## 2023-03-11 RX ADMIN — ONDANSETRON 4 MG: 2 INJECTION INTRAMUSCULAR; INTRAVENOUS at 11:49

## 2023-03-11 RX ADMIN — MORPHINE SULFATE 4 MG: 4 INJECTION, SOLUTION INTRAMUSCULAR; INTRAVENOUS at 15:43

## 2023-03-11 RX ADMIN — MIRTAZAPINE 30 MG: 15 TABLET, FILM COATED ORAL at 22:38

## 2023-03-11 RX ADMIN — MORPHINE SULFATE 4 MG: 4 INJECTION, SOLUTION INTRAMUSCULAR; INTRAVENOUS at 11:50

## 2023-03-11 ASSESSMENT — ENCOUNTER SYMPTOMS
BACK PAIN: 1
SHORTNESS OF BREATH: 0
FEVER: 0
NUMBNESS: 0
CHILLS: 0
WEAKNESS: 0
ABDOMINAL PAIN: 0

## 2023-03-11 NOTE — DISCHARGE INSTRUCTIONS
Lumbar microdiskectomy/laminectomy  INCISION CARE:  The day after your surgery the outer dressing may be removed. It is important to have a friend or family member check your incision for the first week. Call our office immediately if you develop signs and symptoms of infection, including drainage, redness, swelling and/or fever greater than 100.5 degrees.  BATHING:  No tub baths, swimming, Jacuzzi tubs or any other activity that would require full submersion of your body in water the first 3 weeks. You may shower normally the day after your surgery. After showering, pat the incision dry. You do not have to reapply a dressing, as it is best to allow air to circulate around the incision.  PAIN CONTROL:  You will be given a prescription for pain medication when you leave the hospital. Take your pain medicine as prescribed with food if possible. You can expect to have pain during the healing process from the incision or continued nerve pain in your leg(s). The pain from the incision will improve after a few days.   You may use ice along the incision to help with discomfort.   Most patients continue to have some leg pain, numbness or tingling for several weeks following surgery, but is usually less severe than before surgery. This is from the nerve recovering and is entirely normal and is related to swelling or irritability of the nerve while it is establishing new blood supply. As the pain improves, you may use Tylenol or Advil instead of your prescribed pain medication.  DO NOT DRIVE WHILE TAKING PAIN MEDICATION.  Some patients find that they have some difficulty with constipation after surgery. This is due to a combination of things. Most of this is due to the pain medication you are taking. The pain medications, along with a decrease in activity, can sometimes lead to discomfort from constipation. You should eat plenty of fresh fruits, vegetables, drink fruit juices and drink plenty of water.  RESTRICTIONS:  Avoid  lifting objects heavier than 20 pounds for 3 to 4 weeks.  Always use good body mechanics when lifting. Limit bending and twisting at the waist.  DO NOT SMOKE! Smoking is not healthy for your back or healing.  ! Smoking is not healthy for your back or healing.   EXERCISE:  Walking is an excellent exercise and good for recovery. It increases the blood supply to your spinal structures and helps the healing process. If your leg pain is improved, you may begin walking short distances and increase gradually on a daily basis.  Increase walking time or distance by 5 - 10% per day. If walking increases your leg pain, you may be progressing too quickly or your nerve may need additional time to recover. If this occurs, decrease or stop your walking for a few days and start very slow again.  Remember, nerve recovery may take awhile especially if your pain has been present for a long period of time. Be Patient!  FOLLOW UP:  Make an appointment 2 - 3 weeks after your surgery.  Usually you will be allowed to resume all your normal activities 3 weeks after your surgery.  Some patients may be referred for a physical therapy program.  Call our office immediately if you develop signs and symptoms of infection including:  Drainage  Redness  Swelling  Fever greater than 100.5 degrees      [facility]: [phone], please call with any questions

## 2023-03-11 NOTE — ED PROVIDER NOTES
Emergency Medicine Note  HPI   HISTORY OF PRESENT ILLNESS     HPI     62 yo F s/p recent right L5-S1 discectomy 3/9 (two days ago) with Dr. Traore who presents for evaluation of low back pain. Patient states after her surgery she was discharged home with her son and daughter who have been taking care of her.  Last night she developed worsening right low back pain with radiation to right lower extremity. She states because of the pain she feels weak and she is not able to walk or use the bedpan despite using home Toradol and Percocet.  She last took these last night.  She denies fever, bowel or bladder incontinence, saddle anesthesia, numbness or tingling, focal weakness.  She does report urinary urgency and frequency this morning.  No falls or trauma.        Patient History   PAST HISTORY     Reviewed from Nursing Triage:       Past Medical History:   Diagnosis Date   • Anxiety    • Back pain    • COVID-19    • Depression    • Hypertension    • Lipid disorder    • Panic attacks        Past Surgical History:   Procedure Laterality Date   •  SECTION     • EPIDURAL STEROID INJECTION INTERLAMINAR THORACIC     • FRACTURE SURGERY Left     ankle ORIF   • ROTATOR CUFF REPAIR         Family History   Problem Relation Age of Onset   • Heart disease Biological Mother        Social History     Tobacco Use   • Smoking status: Every Day     Packs/day: 0.50     Types: Cigarettes   • Smokeless tobacco: Never   Vaping Use   • Vaping status: Never Used     Passive vaping exposure: Yes   Substance Use Topics   • Alcohol use: Not Currently   • Drug use: Never         Review of Systems   REVIEW OF SYSTEMS     Review of Systems   Constitutional: Negative for chills and fever.   Respiratory: Negative for shortness of breath.    Gastrointestinal: Negative for abdominal pain.   Musculoskeletal: Positive for back pain.   Skin: Negative for rash.   Neurological: Negative for weakness and numbness.         VITALS     ED Vitals     Date/Time Temp Pulse Resp BP SpO2 [facility]   03/11/23 1033 36.6 °C (97.8 °F) 82 18 150/81 96 % AG                       Physical Exam   PHYSICAL EXAM     Physical Exam  Vitals and nursing note reviewed.   Constitutional:       General: She is not in acute distress.     Appearance: She is well-developed.   HENT:      Head: Atraumatic.   Eyes:      Conjunctiva/sclera: Conjunctivae normal.   Pulmonary:      Effort: Pulmonary effort is normal.   Musculoskeletal:         General: No deformity.   Skin:     General: Skin is warm and dry.   Neurological:      Mental Status: She is alert. Mental status is at baseline.      Comments: CN 2-12 grossly intact, Strength 5/5 in bilat lower extremities, sensation equal and discernable over bilat  lower extremities.   Psychiatric:         Behavior: Behavior normal.           PROCEDURES     Procedures     DATA     Results     None          Imaging Results    None         No orders to display       Scoring tools                                  ED Course & MDM   MDM / ED COURSE / CLINICAL IMPRESSION / DISPO     MDM    ED Course as of 03/11/23 1750   Sat Mar 11, 2023   1148 Impression/Plan: recent discectomy L5-S1 two days ago with Dr. Traore presenting with worsening low back pain radiation to RLE  Pt afebrile, no urinary or bowel retention/incontinence or saddle anesthesia. No h/o IVDA. Abd soft, nontender without pulsatile mass. Strength 5 out of 5, distal sensation intact. We will check labs including inflammatory markers and consult orthopaedics [JV]   1211 Post void residual 250 [JV]   1211 WBC: 9.29 [JV]   1218 Spoke with ortho, to see pt [JV]   1558 Ortho requesting 10mg IV decadron for pt pain control [JV]   1750 Due to intractable back pain, difficulty performing daily activities such as going to the bathroom and fall risk the patient is admitted under orthopedic service [JV]      ED Course User Index  [JV] Billie Sesay PA C     Clinical Impression      None                Billie Sesay PA C  03/18/23 0947

## 2023-03-11 NOTE — ED ATTESTATION NOTE
I have personally seen and examined the patient.  I have performed the key components of the encounter and provided a substantive portion of the care and medical decision making for the patient.     I reviewed and agree with resident / physician assistant / nurse practitioner’s assessment and plan of care, with any exceptions as documented below.     My focused history, examination, assessment, and plan of care of  Anna Pruett is as follows:       Vital Signs Review: Vital signs have been reviewed. The oxygen saturation is  SpO2: 96 % which is  normal.    The patient is a 63-year-old female with past medical history of hypertension, anxiety, lipid disorder, depression, back pain, who presented to the emergency department for evaluation of back pain.  The patient had recent back surgery performed 2 days ago with right L5-S1 discectomy.  The patient states that her pain has worsened and she is unable to ambulate due to the pain.  The patient's pain is in the lumbar back region with pain that is sharp radiating down the entire right lower extremity.  Patient tried oxycodone at home without relief.  Patient denies recent fever, chills, cough, dyspnea, chest pain, abdominal pain, bowel or bladder incontinence, new focal neurologic weakness or numbness.  Patient's pain is worse with movement and standing.      Physical exam: Vital signs reviewed.  General: Patient appears comfortable lying in bed.  Patient appears to have lumbar back pain.  Pain is worse with movement.  HEENT: NCAT, EOMI, MMM.  Neck: Supple, nontender.  Chest: Lungs clear to auscultation bilaterally, no rales.  Heart: Regular rate and rhythm, no murmurs.  Abdomen: Soft, nontender, nondistended, no guarding, no rebound.  Extremities: No cyanosis or clubbing.  Skin: Warm and dry.  No traumatic injuries.  Neuro: GCS 15.  5/5 strength bilateral lower extremities limited testing on right lower extremity due to pain with movement.  Sensation normal.  Affect:  Normal.  Back: Dressing intact.  No purulent drainage or surrounding erythema.    Plan: Check labs.  Consult orthopedics.    Labs Reviewed   CBC AND DIFF - Abnormal       Result Value    WBC 9.29      RBC 4.26      Hemoglobin 12.2      Hematocrit 37.6      MCV 88.3      MCH 28.6      MCHC 32.4      RDW 14.9 (*)     Platelets 291      MPV 11.2      Differential Type Manu      Neutrophils 64      Lymphocytes 20      Monocytes 9      Eosinophils 5      Basophils 0      Atypical Lymphocytes 2      Neutrophils, Absolute 5.95      Lymphocytes, Absolute 1.86      Monocytes, Absolute 0.84 (*)     Eosinophils, Absolute 0.46 (*)     Basophils, Absolute 0.00 (*)     Atypical Lymphs, Absolute 0.19 (*)     RBC Morphology Normal      PLT Morphology Normal      Platelet Estimate Adequate (150,000-400,000)     COMPREHENSIVE METABOLIC PANEL - Abnormal    Sodium 141      Potassium 3.8      Chloride 112 (*)     CO2 25      BUN 10      Creatinine 0.6      Glucose 87      Calcium 8.7 (*)     AST (SGOT) 28      ALT (SGPT) 19      Alkaline Phosphatase 67      Total Protein 7.1      Albumin 3.6      Bilirubin, Total 0.6      eGFR >60.0      Anion Gap 4     C-REACTIVE PROTEIN - Abnormal    CRP 9.80 (*)    UA REFLEX CULTURE (MACROSCOPIC) - Normal    Color, Urine Colorless      Clarity, Urine Clear      Specific Gravity, Urine 1.009      pH, Urine 7.0      Leukocyte Esterase Negative      Nitrite, Urine Negative      Protein, Urine Negative      Glucose, Urine Negative      Ketones, Urine Negative      Urobilinogen, Urine 0.2      Bilirubin, Urine Negative      Blood, Urine Negative     URINALYSIS REFLEX CULTURE (ED AND OUTPATIENT ONLY)    Narrative:     The following orders were created for panel order UA with reflex culture.  Procedure                               Abnormality         Status                     ---------                               -----------         ------                     UA Reflex to Culture (Ma...[[id number]]   Normal              Final result                 Please view results for these tests on the individual orders.   RAINBOW DRAW PANEL    Narrative:     The following orders were created for panel order [place] Draw Panel.  Procedure                               Abnormality         Status                     ---------                               -----------         ------                     RAINBOW RED[[id number]]                                      In process                 RAINBOW PINK[[id number]]                                     In process                 RAINBOW LT BLUE[[id number]]                                  In process                 RAINBOW GOLD[[id number]]                                     In process                   Please view results for these tests on the individual orders.   SEDIMENTATION RATE   RAINBOW RED   RAINBOW PINK   RAINBOW LT BLUE   RAINBOW GOLD           MDM: Patient test results reviewed.  Awaiting orthopedic evaluation.    Patient admitted on orthopedic service.      Impression: Acute intractable lumbar back pain, sciatica status post recent lumbar discectomy.  Acute ambulatory dysfunction.     I was physically present for the key/critical portions of the following procedures: None    This document was created using Dragon dictation software.  There may be some typographical errors due to this technology.     Richard Meza MD  03/11/23 [id number]

## 2023-03-12 PROBLEM — F39 MOOD DISORDER (CMS/HCC): Status: ACTIVE | Noted: 2023-03-12

## 2023-03-12 PROCEDURE — 97530 THERAPEUTIC ACTIVITIES: CPT | Mod: GP,U8

## 2023-03-12 PROCEDURE — 97535 SELF CARE MNGMENT TRAINING: CPT | Mod: GO,U8

## 2023-03-12 PROCEDURE — 97116 GAIT TRAINING THERAPY: CPT | Mod: GP,U8

## 2023-03-12 PROCEDURE — 63600000 HC DRUGS/DETAIL CODE: Mod: JW | Performed by: PHYSICAL THERAPIST

## 2023-03-12 PROCEDURE — 99254 IP/OBS CNSLTJ NEW/EST MOD 60: CPT | Performed by: REGISTERED NURSE

## 2023-03-12 PROCEDURE — 63700000 HC SELF-ADMINISTRABLE DRUG: Performed by: PHYSICAL THERAPIST

## 2023-03-12 PROCEDURE — 97166 OT EVAL MOD COMPLEX 45 MIN: CPT | Mod: GO,U8

## 2023-03-12 PROCEDURE — 97162 PT EVAL MOD COMPLEX 30 MIN: CPT | Mod: GP,U8

## 2023-03-12 PROCEDURE — 12000000 HC ROOM AND CARE MED/SURG

## 2023-03-12 RX ORDER — ACETAMINOPHEN 325 MG/1
650 TABLET ORAL EVERY 6 HOURS
Qty: 240 TABLET | Refills: 0 | Status: SHIPPED | OUTPATIENT
Start: 2023-03-12 | End: 2023-04-11

## 2023-03-12 RX ADMIN — KETOROLAC TROMETHAMINE 15 MG: 15 INJECTION, SOLUTION INTRAMUSCULAR; INTRAVENOUS at 09:49

## 2023-03-12 RX ADMIN — ACETAMINOPHEN 650 MG: 325 TABLET ORAL at 23:29

## 2023-03-12 RX ADMIN — DEXAMETHASONE SODIUM PHOSPHATE 10 MG: 4 INJECTION, SOLUTION INTRA-ARTICULAR; INTRALESIONAL; INTRAMUSCULAR; INTRAVENOUS; SOFT TISSUE at 09:49

## 2023-03-12 RX ADMIN — THERA TABS 1 TABLET: TAB at 09:49

## 2023-03-12 RX ADMIN — ACETAMINOPHEN 650 MG: 325 TABLET ORAL at 12:16

## 2023-03-12 RX ADMIN — AMLODIPINE BESYLATE 10 MG: 10 TABLET ORAL at 09:49

## 2023-03-12 RX ADMIN — ACETAMINOPHEN 650 MG: 325 TABLET ORAL at 17:50

## 2023-03-12 RX ADMIN — ACETAMINOPHEN 650 MG: 325 TABLET ORAL at 05:28

## 2023-03-12 RX ADMIN — DEXAMETHASONE SODIUM PHOSPHATE 10 MG: 4 INJECTION, SOLUTION INTRA-ARTICULAR; INTRALESIONAL; INTRAMUSCULAR; INTRAVENOUS; SOFT TISSUE at 00:48

## 2023-03-12 RX ADMIN — KETOROLAC TROMETHAMINE 15 MG: 15 INJECTION, SOLUTION INTRAMUSCULAR; INTRAVENOUS at 22:13

## 2023-03-12 RX ADMIN — ROSUVASTATIN CALCIUM 40 MG: 40 TABLET, COATED ORAL at 17:50

## 2023-03-12 ASSESSMENT — COGNITIVE AND FUNCTIONAL STATUS - GENERAL
TOILETING: 1 - TOTAL
STANDING UP FROM CHAIR USING ARMS: 3 - A LITTLE
DRESSING REGULAR UPPER BODY CLOTHING: 3 - A LITTLE
DRESSING REGULAR LOWER BODY CLOTHING: 1 - TOTAL
HELP NEEDED FOR BATHING: 2 - A LOT
AFFECT: ANXIOUS
WALKING IN HOSPITAL ROOM: 2 - A LOT
AFFECT: WFL
MOVING TO AND FROM BED TO CHAIR: 3 - A LITTLE
CLIMB 3 TO 5 STEPS WITH RAILING: 1 - TOTAL
EATING MEALS: 4 - NONE
HELP NEEDED FOR PERSONAL GROOMING: 3 - A LITTLE

## 2023-03-12 NOTE — ASSESSMENT & PLAN NOTE
Pt is a 63 year old female with a hx of depression, anxiety and L5-S1 discectomy on 3/9 who was admitted for increased post op pain, seen on consultation for passive SI. Pt endorsed passive SI in the setting of severe pain yesterday which has since improved. Denies passive or active suicidal ideation, intent or plan. Denies overt depression, experiences intermittent anxiety and follows with outpt PMHNP. Forward thinking, protective factors of family and malick. Psych meds were held for one week prior to surgery, pt would like to restart. Denied AVH/HI.     Plan  Disposition: Pt to follow up with outpt psychiatric NP upon discharge.   Psychiatric Clearance: yes  Observation level - 1:1 needed? No  Additional work up/ labs: Please obtain EKG to ensure safe QTc prior to restarting psych meds  Pharmacological:  Recommend restart sertraline - 50mg PO daily x 3 days then increase to 100mg PO daily. Hold QTC>480  Remeron restarted by medical team. Would hold for QTc>480  Prn ativan per medical team, would not d/c with rx   Follow up needed while in hospital: Psychiatry will follow as needed.

## 2023-03-12 NOTE — PLAN OF CARE
Problem: Adult Inpatient Plan of Care  Goal: Plan of Care Review  Outcome: Progressing  Flowsheets (Taken 3/12/2023 0057)  Progress: improving  Plan of Care Reviewed With: patient  Outcome Summary: Pt arrived to floor, oriented to room and staff. All question and concerns answered. Pt A X 1 to bathroom. Pt reports moderate pain. Discussed PRN and scheduled medications, pt verbalized understanding. Bed alarm on, call bell within reach.  Goal: Patient-Specific Goal (Individualized)  Outcome: Progressing  Goal: Absence of Hospital-Acquired Illness or Injury  Outcome: Progressing  Goal: Optimal Comfort and Wellbeing  Outcome: Progressing  Goal: Readiness for Transition of Care  Outcome: Progressing   Plan of Care Review  Plan of Care Reviewed With: patient  Progress: improving  Outcome Summary: Pt arrived to floor, oriented to room and staff. All question and concerns answered. Pt A X 1 to bathroom. Pt reports moderate pain. Discussed PRN and scheduled medications, pt verbalized understanding. Bed alarm on, call bell within reach.

## 2023-03-12 NOTE — PLAN OF CARE
Care Coordination Admission Assessment Note  Date: 3/12/2023   Time: 4:45 PM    Patient Name: Anna Pruett  Medical Record Number: [id number]  YOB: 1960  Room/BED: 4227    General Information  Patient-Specific Goals (Include Timeframe)  pt states she cannot walk and needs to go to rehab  PCP: Dilcia Blackburn FNP   Insurance Coverage: [employer]  Readmission Within the last 30 days  other (see comments) (adm 3/10/22 for l5-s1 microdiscectomy)    Advance Directives (for Healthcare)?  Does patient have advance directive?: No  Patient has Advance Directive: Advance Directive is NOT in chart, requested to bring in  Name of person requested to bring in advance directive: Lily  Does patient have current OOH DNR form?: No  Patient does not have current OOH DNR form: Patient/Family declines further information  Does patient have current POLST?: No  Patient does not have current POLST: Patient/Family declines further information  Does patient have mental health advance directive?: No  Patient does not have Mental Health Advance Directive: Patient/Family declines further information        Information       Living Arrangements  Arrived From: home  Current Living Arrangements: home  People in Home: child(lidia), adult  Home Accessibility: not wheelchair accessible  Living Arrangement Comments: pt lives wiht her dght in house with 7 steps to enter,her bed and bath is on the third floor  Able to Return to Prior Arrangements: no    Housing Stability and Financial Resources (SDOH)  In the last 12 months, was there a time when you were not able to pay the mortgage or rent on time?: No  In the last 12 months, was there a time when you did not have a steady place to sleep or slept in a shelter (including now)?: No  How hard is it for you to pay for the very basics like food, housing, medical care, and heating?: Somewhat hard    Current Outpatient/Agency/Support Group       Type of Current Home  Care Services       Assistive Device/Animal Currently Used at Home  shower chair, stair glide, raised toilet, commode, 3-in-1 (ordered recliner)    Functional Status Comments  pt states she is not able to ambulate    IADL Comments  pt  was having increased back pain trying to do her adl's    Employment/ Status  Employment Status: disabled    Concerns to be Addressed  care coordination/care conferences, adjustment to diagnosis/illness, discharge planning    Current Discharge Risk       Anticipated Changes Related to Illness  inability to care for self    Transportation Concerns (SDOH)  Transportation Concerns: car, none  Transportation Anticipated: health plan transportation  In the past 12 months, has lack of transportation kept you from medical appointments or from getting medications?: No  In the past 12 months, has lack of transportation kept you from meetings, work, or from getting things needed for daily living?: No    Concerns Comments  pt with increased anxiety and depression .it was difficult to receive a reply to my questions since pt  had to keep asking her dght to answer them.    Anticipated Clinical Needs       Anticipated Discharge Plan   Anticipated Discharge Disposition: skilled nursing facility  Patient/Family Anticipated Services at Transition: skilled nursing  Met with patient. Provided education and contact information for Care Coordination services.: yes  Screening complete: yes \    Pt is sp l5-s1 microdiscectomy 2 days ago is adm with severe back pain and inability to ambulate.she has high anxiety and is being seen by psych np.therapy has recommended snf placement.referrals sent.pt would like to go to [facility] or [facility].

## 2023-03-12 NOTE — H&P
Ortho H&P  Admitting Diagnosis:  Postoperative back pain [G89.18, M54.9]  HPI     Patient is a 63 y.o. female with a past medical history of hypertension, depression, anxiety, and lower back pain who was admitted for lumbar radiculopathy radiating to the right lower extremity.  Patient is known to Dr. Traore, who performed an L5/S1 right microdiscectomy 2 days ago.  Patient was feeling well initially after surgery however she began to notice worsening right lower extremity pain where she was unable to ambulate.  Denies any trauma or mechanism of injury to her lower back, numbness or tingling, saddle paresthesias, bowel bladder dysfunction, fevers or chills.    Medical History:   Past Medical History:   Diagnosis Date   • Anxiety    • Back pain    • COVID-19    • Depression    • Hypertension    • Lipid disorder    • Panic attacks        Surgical History:   Past Surgical History:   Procedure Laterality Date   •  SECTION     • EPIDURAL STEROID INJECTION INTERLAMINAR THORACIC     • FRACTURE SURGERY Left     ankle ORIF   • ROTATOR CUFF REPAIR         Social History:   Social History     Social History Narrative   • Not on file       Family History:   Family History   Problem Relation Age of Onset   • Heart disease Biological Mother        Allergies: Trazodone and Tree nut       Medication List      ASK your doctor about these medications    amLODIPine 10 mg tablet  Commonly known as: NORVASC  Take 10 mg by mouth daily.  Dose: 10 mg     ketorolac 10 mg tablet  Commonly known as: TORADOL  Take 10 mg by mouth every 6 (six) hours as needed.  Dose: 10 mg     LORazepam 1 mg tablet  Commonly known as: ATIVAN  Take 1 mg by mouth every 6 (six) hours as needed for anxiety.  Dose: 1 mg     mirtazapine 30 mg tablet  Commonly known as: REMERON  Take 30 mg by mouth nightly.  Dose: 30 mg     multivitamin tablet  Take by mouth daily.     oxyCODONE 5 mg immediate release tablet  Commonly known as: ROXICODONE  Take 5 mg by  mouth every 4 (four) hours as needed for pain.  Dose: 5 mg     rosuvastatin 40 mg tablet  Commonly known as: CRESTOR  Take 40 mg by mouth daily.  Dose: 40 mg     sertraline 100 mg tablet  Commonly known as: ZOLOFT  Take 100 mg by mouth daily.  Dose: 100 mg          Review of Systems  Pertinent items are noted in HPI.    Objective     Vital Signs for the last 24 hours:  Temp:  [36.3 °C (97.4 °F)-36.8 °C (98.2 °F)] 36.3 °C (97.4 °F)  Heart Rate:  [77-92] 78  Resp:  [16-18] 16  BP: (114-155)/(59-81) 114/65    Physical Exam  Physical Examination:   General: AAOx3, verbally responds to questions, responds to stimuli    Cardiac: Regular rate and rhythm  Pulmonary: Normal respiratory effort    Spine:  Postop dressing clean dry and intact  Bilateral lower extremity dermatomes Intact  Bilateral myotomes intact  Negative clonus  Unable to rotate trunk secondary to significant lower back pain  2+ DP pulses bilaterally      Labs  CBC Results       03/11/23 03/06/23     1118 1106    WBC 9.29 4.50    RBC 4.26 4.28    HGB 12.2 12.2    HCT 37.6 37.9    MCV 88.3 88.6    MCH 28.6 28.5    MCHC 32.4 32.2     294        BMP Results       03/11/23 03/06/23     1118 1106     140    K 3.8 4.4    Cl 112 108    CO2 25 24    Glucose 87 85    BUN 10 9    Creatinine 0.6 0.7    Calcium 8.7 8.7    Anion Gap 4 8    EGFR >60.0 >60.0         Comment for K at 1118 on 03/11/23: Results obtained on plasma. Plasma Potassium values may be up to 0.4 mEQ/L less than serum values. The differences may be greater for patients with high platelet or white cell counts.          Imaging  X-ray lumbar spine: No acute osseous abnormalities      Assessment/Plan   Patient is a 60-year-old female who was admitted for right lower extremity pain secondary to lumbar radiculopathy the setting of L5-S1 microdiscectomy by Dr. Traore on 3/9.      -Decadron 10 mg every 8 hours  -Standing Tylenol  -Oxycodone as needed  -Pain management consult  -Out of bed with  PT/OT  -Weightbearing as tolerated right lower extremity  -Regular diet      Eric Davenport,   Orthopedic Surgery, PGY1        Code Status: No Order

## 2023-03-12 NOTE — PROGRESS NOTES
Patient: Anna Pruett  Location:  [address] [address]  MRN:  [id number]  Today's date:  3/12/2023    Lashae is a 63 y.o. female admitted on 3/11/2023 with Postoperative back pain [G89.18, M54.9]. Principal problem is Postoperative back pain.    Past Medical History  Lashae has a past medical history of Anxiety, Back pain, COVID-19, Depression, Hypertension, Lipid disorder, and Panic attacks.    History of Present Illness   Patient is a 63 y.o. female with a past medical history of hypertension, depression, anxiety, and lower back pain who was admitted for lumbar radiculopathy radiating to the right lower extremity.  Patient is known to Dr. Traore, who performed an L5/S1 right microdiscectomy 2 days ago.  Patient was feeling well initially after surgery however she began to notice worsening right lower extremity pain where she was unable to ambulate.      PT Vitals    Date/Time Pulse HR Source SpO2 Pt Activity O2 Therapy BP BP Location BP Method Pt Position [facility]   03/12/23 1355 84 Monitor 98 % At rest None (Room air) 138/69 Left upper arm Automatic Sitting [facility]   03/12/23 1425 75 Monitor 96 % Walking None (Room air) 141/72 Left upper arm Automatic Lying [facility]      PT Pain    Date/Time Pain Type Location Rating: Rest Rating: Activity Radiation to Interventions [facility]   03/12/23 1355 Pain Assessment back 5 5 leg, right pillow support provided;position adjusted;care clustered [facility]   03/12/23 1425 Pain Reassessment back 8 8 leg, right position adjusted [facility]          Prior Living Environment    Flowsheet Row Most Recent Value   People in Home child(lidia), adult   Current Living Arrangements home   Living Environment Comment multi-level home with 4+4 COLTON L railing, FFSU possible with stall shower        Prior Level of Function    Flowsheet Row Most Recent Value   Dominant Hand right   Ambulation independent   Transferring independent   Toileting independent   Bathing independent   Dressing independent   Eating independent    Prior Level of Function Comment previoulsy IND with mobility and self care, pt reporting requiring family assistance/limited mobility PTA 2/2 pain   Assistive Device Currently Used at Home shower chair, stair glide, raised toilet, commode, 3-in-1  [ordered recliner]           PT Evaluation and Treatment - 03/12/23 1349        PT Time Calculation    Start Time 1349     Stop Time 1429     Time Calculation (min) 40 min        General Information    Document Type initial evaluation     Mode of Treatment physical therapy     Position at Start of Session --   ambulating from bathroom with daughter and PCT with RW    Status at Start of Session agreeable to therapy;no issues or concerns identified by nurse prior to session        Precautions/Limitations/Impairments    Existing Precautions/Restrictions fall;spinal;weight bearing     Left LE Weight-Bearing Status weight-bearing as tolerated (WBAT)     Right LE Weight-Bearing Status weight-bearing as tolerated (WBAT)        Living Environment    Primary Care Provided by self        Cognition/Psychosocial    Affect/Mental Status (Cognition) anxious     Orientation Status (Cognition) oriented x 3     Follows Commands (Cognition) follows one-step commands;delayed response/completion;increased processing time needed     Cognitive Function attention deficit;safety deficit     Attention Deficit (Cognition) minimal deficit;concentration;focused/sustained attention     Comment, Attention pain limited     Safety Deficit (Cognition) minimal deficit;safety precautions awareness;safety precautions follow-through/compliance     Comment, Cognition receptive to education on body mechanics and safety with moblity. limited by pain        Sensory    Hearing Status WFL        Vision Assessment/Intervention    Visual Impairment/Limitations corrective lenses full-time        Sensory Assessment (Somatosensory)    Left LE Sensory Assessment light touch awareness;intact     Right LE Sensory  Assessment light touch awareness;intact        Range of Motion (ROM)    Left Lower Extremity (ROM) left LE ROM is WFL     Right Lower Extremity (ROM) right LE ROM is WFL   though guarded 2/2 pain       Strength (Manual Muscle Testing)    Left Lower Extremity Strength left LE strength is WFL   grossly 4/5    Right Lower Extremity Strength right LE strength is WFL   >=3+/5 though guarded 2/2 pain       Bed Mobility    Bed Mobility sidelying to sit;sit to sidelying     [place], Sit to Sidelying minimum assist (75% or more patient effort);1 person assist;verbal cues     Verbal Cues (Sit to Sidelying) hand placement;maintaining precautions;technique     Assistive Device bed rails     Comment (Bed Mobility) sit to s/l to supine via log roll tech, severe burning and spasm type pain RLE with transition        Chair to Bed Transfer    [place], Chair to Bed minimum assist (75% or more patient effort);2 person assist;verbal cues     Verbal Cues hand placement;proper use of assistive device;safety;technique     Assistive Device walker, front-wheeled     Comment chair to bed on R via SPTx with RW, decreased RLE wt bearing tolerance        Sit to Stand Transfer    [place], Sit to Stand Transfer minimum assist (75% or more patient effort);1 person assist;verbal cues     Verbal Cues preparatory posture;hand placement;technique     Assistive Device walker, front-wheeled     Comment from recliner, slow to rise, decreased RLE wt bearing tolerance        Stand to Sit Transfer    [place], Stand to Sit Transfer minimum assist (75% or more patient effort);1 person assist;verbal cues     Verbal Cues hand placement;safety;technique     Assistive Device walker, front-wheeled     Comment to recliner, to EOB, assist to control descent, decreased RLE wt bearing tolerance        Gait Training    [place], Gait minimum assist (75% or more patient effort);2 person assist;increased time to complete;safety  considerations;verbal cues     Assistive Device walker, front-wheeled     Distance in Feet 6 feet     Pattern (Gait) step-to     Deviations/Abnormal Patterns (Gait) gait speed decreased;step length decreased;weight shifting decreased;antalgic     Comment (Gait/Stairs) pt with limited RLE wt bearing tolerance, +LOB/buckling with pain x 2, min A x 1 for bracing RLE and min A x 1 balance with ambulation, chair follow provided, distance limited by pain        Stairs Training    [place], Stairs unable to assess;safety considerations     Comment severe pain with ambulation on level surfaces        Safety Issues, Functional Mobility    Impairments Affecting Function (Mobility) balance;endurance/activity tolerance;pain        Mobility Belt    Mobility Belt Used for All Out of Bed Activity no     Reason Mobility Belt Not Used s/p spine sx        Balance    Balance Assessment sitting static balance;sitting dynamic balance;sit to stand dynamic balance;standing static balance;standing dynamic balance     Static Sitting Balance WFL;supported;sitting in chair     Dynamic Sitting Balance mild impairment;unsupported;sitting in chair     Sit to Stand Dynamic Balance mild impairment;supported     Static Standing Balance mild impairment;supported;asymmetrical weight shifting     Dynamic Standing Balance moderate impairment;supported;asymmetrical weight shifting     Comment, Balance educated on use of pillow supports while sitting in chair to improve neutral spine positioning, min A for balance in stance with RW decreased RLE wt bearing tolerance 2/2 pain        Motor Skills    Functional Endurance pain limited        AM-PAC (TM) - Mobility (Current Function)    Turning from your back to your side while in a flat bed without using bedrails? 3 - A Little     Moving from lying on your back to sitting on the side of a flat bed without using bedrails? 3 - A Little     Moving to and from a bed to a chair? 3 - A Little     Standing up  from a chair using your arms? 3 - A Little     To walk in a hospital room? 2 - A Lot     Climbing 3-5 steps with a railing? 1 - Total     AM-PAC (TM) Mobility Score 15        Session Outcome    Daily Outcome Statement PT cari completed, [facility] 15.  pt requiring min A of 1-2 with max v.c. for safety and tech with functional transfers, bed mobility and short distance ambulation wiht RW.  pt limited by moderate back pain and severe RLE burning and spasming type pain challenging wt bearing tolerance and resulting in some LOB and buckling during gait assessment. pt currently below her functional baseline and will benefit from continued PT, rec SNF when medically cleared.     Position at End of Session supine in bed     Safety Factors call light in reach;bed alarm     Status at End of Session all needs met;personal items in reach     Nursing Notified patient's performance;patient's position;patient's response to therapy/activity   pain reports       Plan    Rehab Potential good, to achieve stated therapy goals     Therapy Frequency 5 times/wk     Planned Therapy Interventions balance training;bed mobility training;gait training;home exercise program;patient/family education;strengthening;stair training;transfer training;other (see comments)   endurance training              PT Discharge Recommendations    Flowsheet Row Most Recent Value   PT Recommended Discharge Disposition skilled nursing facility at 03/12/2023 1349   Anticipated Equipment Needs at Discharge (PT) other (see comments)  [TBD at next level of care, currently benefitting from RW] at 03/12/2023 1349   Patient/Family Therapy Goals Statement less pain, be safe at 03/12/2023 1349                  Education Documentation  Joint Mobility/Strength, taught by Marry Xavier, PT at 3/12/2023  3:42 PM.  Learner: Patient  Readiness: Acceptance  Method: Explanation, Demonstration  Response: Verbalizes Understanding, Demonstrated Understanding, Needs  Reinforcement  Comment: role of PT, PT POC, spinal precautions, safety with RW, use of call bell/assist with moblity, logroll tech, d/c recs    Assistive/Adaptive Devices, taught by Marry Xavier, PT at 3/12/2023  3:42 PM.  Learner: Patient  Readiness: Acceptance  Method: Explanation, Demonstration  Response: Verbalizes Understanding, Demonstrated Understanding, Needs Reinforcement  Comment: role of PT, PT POC, spinal precautions, safety with RW, use of call bell/assist with moblity, logroll tech, d/c recs          PT Goals    Flowsheet Row Most Recent Value   Bed Mobility Goal 1    Activity/Assistive Device rolling to left, rolling to right, scooting, sidelying to sit, sit to sidelying at 03/12/2023 1349   [place] modified independence at 03/12/2023 1349   Time Frame by discharge at 03/12/2023 1349   Progress/Outcome goal ongoing at 03/12/2023 1349   Transfer Goal 1    Activity/Assistive Device sit-to-stand/stand-to-sit, bed-to-chair/chair-to-bed, stand pivot  [AAD/LRAD vs no AD] at 03/12/2023 1349   [place] modified independence at 03/12/2023 1349   Time Frame by discharge at 03/12/2023 1349   Progress/Outcome goal ongoing at 03/12/2023 1349   Gait Training Goal 1    Activity/Assistive Device gait (walking locomotion)  [AAD/LRAD vs no AD] at 03/12/2023 1349   [place] modified independence at 03/12/2023 1349   Distance 150 at 03/12/2023 1349   Time Frame by discharge at 03/12/2023 1349   Progress/Outcome goal ongoing at 03/12/2023 1349   Stairs Goal 1    Activity/Assistive Device ascending stairs, descending stairs, using handrail, left, step-over step, step-to-step, using handrail, right, cane, straight at 03/12/2023 1349   [place] modified independence at 03/12/2023 1349   Number of Stairs 8 at 03/12/2023 1349   Time Frame by discharge at 03/12/2023 1349   Progress/Outcome goal ongoing at 03/12/2023 1349

## 2023-03-12 NOTE — HOSPITAL COURSE
Lashae is a 63 y.o. female admitted on 3/11/2023 with Postoperative back pain [G89.18, M54.9]. Principal problem is Postoperative back pain.    Past Medical History  Lashae has a past medical history of Anxiety, Back pain, COVID-19, Depression, Hypertension, Lipid disorder, and Panic attacks.    History of Present Illness   Patient is a 63 y.o. female with a past medical history of hypertension, depression, anxiety, and lower back pain who was admitted for lumbar radiculopathy radiating to the right lower extremity.  Patient is known to Dr. Traore, who performed an L5/S1 right microdiscectomy 2 days ago.  Patient was feeling well initially after surgery however she began to notice worsening right lower extremity pain where she was unable to ambulate.

## 2023-03-12 NOTE — CONSULTS
"Psychiatry Consult    CC: This is a 63 year old female seen on consultation for SI    Subjective     Anna Pruett is a 63 y.o. female with a PMH of hypertension, anxiety, lipid disorder, depression, back pain and right L5-S1 discectomy on 3/9 who presented to the ER c/o increasing post op back pain. Pt admitted for lumbar radiculopathy radiating to RLE. Pt noted SI yesterday r/t pain, denies current SI to RN, psychiatry was consulted for eval.     Pt received laying in bed, AAOx4. Introduced self and role, pt agreeable to psychiatric interview. Daughter ok to remain at bedside for interview. 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 Pt was able to d/c home without issue. However, over the last day noted increasing back pain radiating down right leg, unrelieved with oxycodone and was unable to ambulate. Pt states \"the pain was unbearable last night and I wanted to die. I was asking God to take me but its not my time.\" Started to think of all the family she has lost. Pt denied suicidal ideation, intent or plan at that time and currently. Denies current passive SI. Notes strong malick and family as protective factors. Pain has since improved. Pt states mood as been stable, current mood \"tired\" does intermittently experience anxiety. Follows with out psychiatrist and feels stable on her current medication regimen, psych med had been stopped one week prior to surgery, would like to restart. Denied AVH/HI.     Outside records reviewed. No previous psych eval's noted.     Psychiatric History: Hx of depression and anxiety.     Suicide Risk Factors:  Previous Suicide Attempts: No  Access to Firearms: No  Chronic Risk Factors: Psychiatric illness (mood, anxiety, psychotic, personality, SUDS, other) and Chronic physical illness or pain  Proximal Risk Factors: Recent major stressor (relationship loss, death of loved one, financial/job/school loss, legal " trouble)  Protective Factors: Connectedness to individuals, family, community, or social institutions, Identified reasons for living, Responsibility to dependents/pets, Moral or Catholic prohibitions against suicide, Access to effective mental health care and Problem-solving and conflict resolution skills  Current Psychiatrist: yes - Shadia ADRIAN  Past psychiatric Hospitalization: no  Medication Trials:  yes - past: Wellbutrin, trazodone, Prozac. Current: ativan, Zoloft, Remeron  ECT trials: no    Substance Use History: Reports hx of alcohol abuse, denied hx of illicit drug use or rx/OTC misuse.   Substance use:   Drug Details     Questions Responses    Amphetamine frequency Never used    Comment:  Never used on 2/24/2023     Cannabis frequency Never used    Comment:  Never used on 2/24/2023     Cocaine frequency Never used    Comment:  Never used on 2/24/2023     Ecstasy frequency Never used    Comment:  Never used on 2/24/2023     Hallucinogen frequency Never used    Comment:  Never used on 2/24/2023     Inhalant frequency Never used    Comment:  Never used on 2/24/2023     Opiate frequency Never used    Comment:  Never used on 2/24/2023     Sedative frequency Never used    Comment:  Never used on 2/24/2023     Other drug frequency Never used    Comment:  Never used on 2/24/2023         Consequences of use: Yes:  Health Problems and Relationship Problems  Past D&A Treatment: Outpt counseling     Family History:   Family History   Problem Relation Age of Onset   • Heart disease Biological Mother        Social History: Single, has two daughter whom are supportive. Has worked in hospice.   Social History     Socioeconomic History   • Marital status: Single     Spouse name: None   • Number of children: None   • Years of education: None   • Highest education level: None   Tobacco Use   • Smoking status: Every Day     Packs/day: 0.50     Types: Cigarettes   • Smokeless tobacco: Never   Vaping Use   • Vaping  status: Never Used     Passive vaping exposure: Yes   Substance and Sexual Activity   • Alcohol use: Not Currently   • Drug use: Never     Social Determinants of Health     Food Insecurity: No Food Insecurity (3/11/2023)    Hunger Vital Sign    • Worried About Running Out of Food in the Last Year: Never true    • Ran Out of Food in the Last Year: Never true       Medical History:   Past Medical History:   Diagnosis Date   • Anxiety    • Back pain    • COVID-19    • Depression    • Hypertension    • Lipid disorder    • Panic attacks        Surgical History:   Past Surgical History:   Procedure Laterality Date   •  SECTION     • EPIDURAL STEROID INJECTION INTERLAMINAR THORACIC     • FRACTURE SURGERY Left     ankle ORIF   • ROTATOR CUFF REPAIR         Allergies:   Allergies   Allergen Reactions   • Trazodone    • Tree Nut        Current Medications:  •  acetaminophen, 650 mg, oral, q6h ROBLES  •  amLODIPine, 10 mg, oral, Daily  •  ketorolac, 15 mg, intravenous, q6h PRN  •  LORazepam, 1 mg, oral, q6h PRN  •  mirtazapine, 30 mg, oral, Nightly PRN  •  multivitamin, 1 tablet, oral, Daily  •  oxyCODONE, 5 mg, oral, q4h PRN  •  rosuvastatin, 40 mg, oral, Daily (6p)  •  acetaminophen    Home Medications:  •  amLODIPine, Take 10 mg by mouth daily.  •  ketorolac, Take 10 mg by mouth every 6 (six) hours as needed.  •  LORazepam, Take 1 mg by mouth every 6 (six) hours as needed for anxiety.  •  mirtazapine, Take 30 mg by mouth nightly.  •  multivitamin, Take by mouth daily.  •  oxyCODONE, Take 5 mg by mouth every 4 (four) hours as needed for pain.  •  rosuvastatin, Take 40 mg by mouth daily.  •  sertraline, Take 100 mg by mouth daily.    Review of Systems  Constitutional: positive for fatigue  Musculoskeletal:positive for back pain  Behavioral/Psych: positive for anxiety and fatigue    Objective     Vital Signs for the last 24 hours:  Temp:  [36.3 °C (97.4 °F)-36.8 °C (98.2 °F)] 36.6 °C (97.9 °F)  Heart Rate:  [78-95]  95  Resp:  [16] 16  BP: (112-155)/(62-73) 112/73    Labs  CRP 9.80  Sed rate 54    Imaging  XR Spine IMPRESSION: No acute osseous abnormality. Lateral views are limited due to  technical factors.    ECG   Not applicable.    MENTAL STATUS EXAM  Appearance: well groomed, appropriate attire and clean  Gait and Motor: no abnormal movements  Speech: normal rate/rhythm/volume  Mood: tired  Affect: normal  Associations: coherent  Thought Process: goal-directed  Thought Content: no auditory or visual hallucinations. and appropriate to situation  Suicidality/Homicidality: denies  Judgement/Insight: good and help accepting  Orientation: day, month, year, type of place, name of place and situation  Memory: recalls recent events, recalls remote events and knows current president  Attention: alert    Mood disorder ([facility])  Assessment & Plan  Pt is a 63 year old female with a hx of depression, anxiety and L5-S1 discectomy on 3/9 who was admitted for increased post op pain, seen on consultation for passive SI. Pt endorsed passive SI in the setting of severe pain yesterday which has since improved. Denies passive or active suicidal ideation, intent or plan. Denies overt depression, experiences intermittent anxiety and follows with outpt PMHNP. Forward thinking, protective factors of family and malick. Psych meds were held for one week prior to surgery, pt would like to restart. Denied AVH/HI.     Plan  Disposition: Pt to follow up with outpt psychiatric NP upon discharge. Will consult  services for additional support.   Psychiatric Clearance: yes  Observation level - 1:1 needed? No  Additional work up/ labs: Please obtain EKG to ensure safe QTc prior to restarting psych meds  Pharmacological:  Recommend restart sertraline - 50mg PO daily x 3 days then increase to 100mg PO daily. Hold QTC>480  Remeron restarted by medical team. Would hold for QTc>480  Prn ativan per medical team, would not d/c with rx   Follow up needed  while in hospital: Psychiatry will follow as needed.     Spent 60 minutes in total treatment including chart review, obtaining collateral if applicable, consultation with patient and note writing

## 2023-03-12 NOTE — PLAN OF CARE
Problem: Adult Inpatient Plan of Care  Goal: Plan of Care Review  Outcome: Progressing  Flowsheets (Taken 3/12/2023 8897)  Progress: improving  Plan of Care Reviewed With: patient  Outcome Summary: PT cari completed, [facility] 15

## 2023-03-12 NOTE — CONSULTS
Lashae Pruett welcomed the visit.  Shared supportive conversation and theological discussion.  Lashae shared pictures of her family and friends and shared her favorite Bible stories.  She has a lot of family/friend support and has been in touch with her .    She has requested ongoing support from the Spiritual Care team    Susan Miguel  Spiritual Care Specialist  Pager [phone]  Office [phone]

## 2023-03-12 NOTE — PROGRESS NOTES
Patient:  Anna Pruett  Location:  [address]  MRN:  [id number]  Today's date:  3/12/2023    Lashae is a 63 y.o. female admitted on 3/11/2023 with Postoperative back pain [G89.18, M54.9]. Principal problem is Postoperative back pain.    Past Medical History  Lashae has a past medical history of Anxiety, Back pain, COVID-19, Depression, Hypertension, Lipid disorder, and Panic attacks.    History of Present Illness   Patient is a 63 y.o. female with a past medical history of hypertension, depression, anxiety, and lower back pain who was admitted for lumbar radiculopathy radiating to the right lower extremity.  Patient is known to Dr. Traore, who performed an L5/S1 right microdiscectomy 2 days ago.  Patient was feeling well initially after surgery however she began to notice worsening right lower extremity pain where she was unable to ambulate.      OT Vitals    Date/Time Pulse HR Source SpO2 Pt Activity O2 Therapy BP BP Location BP Method Pt Position [facility]   03/12/23 1355 84 Monitor 98 % At rest None (Room air) 138/69 Left upper arm Automatic Sitting [facility] [place]   03/12/23 1425 75 Monitor 96 % Walking None (Room air) 141/72 Left upper arm Automatic Lying [facility] [place]      OT Pain    Date/Time Pain Type Location Rating: Rest Rating: Activity Radiation to Interventions [facility]   03/12/23 1355 Pain Assessment back 5 5 leg, right pillow support provided;position adjusted;care clustered [facility] [place]   03/12/23 1425 Pain Reassessment back 8 8 leg, right position adjusted [facility] [place]          Prior Living Environment    Flowsheet Row Most Recent Value   People in Home child(lidia), adult   Current Living Arrangements home   Living Environment Comment multi-level home with 4+4 COLTON L railing, FFSU possible with stall shower        Prior Level of Function    Flowsheet Row Most Recent Value   Dominant Hand right   Ambulation independent   Transferring independent   Toileting independent   Bathing independent   Dressing independent   Eating independent    Prior Level of Function Comment previoulsy IND with mobility and self care, pt reporting requiring family assistance/limited mobility PTA 2/2 pain   Assistive Device Currently Used at Home shower chair, stair glide, raised toilet, commode, 3-in-1  [ordered recliner]        Occupational Profile    Flowsheet Row Most Recent Value   Reason for Services/Referral Diminished ADL status s/p fall   Successful Occupations Retired   Performance Patterns +   Environmental Supports and Barriers Daughters are very support         OT Evaluation and Treatment - 03/12/23 1348        OT Time Calculation    Start Time 1348     Stop Time 1428     Time Calculation (min) 40 min        General Information    Document Type initial evaluation     Mode of Treatment occupational therapy     Position at Start of Session --   walking out of bathroom with RN    Status at Start of Session no issues or concerns identified by nurse prior to session;agreeable to therapy        Precautions/Limitations/Impairments    Existing Precautions/Restrictions fall;spinal        Cognition/Psychosocial    Affect/Mental Status (Cognition) WFL     Orientation Status (Cognition) oriented x 4     Follows Commands (Cognition) WFL     Cognitive Function WFL     Comment, Cognition appears grossly WFL        Vision Assessment/Intervention    Visual Impairment/Limitations corrective lenses full-time        Sensory Assessment    Left UE Sensory Assessment intact     Right UE Sensory Assessment intact     Sensory Subjective Reports --   denies sensory changes       Range of Motion (ROM)    Range of Motion ROM is WFL;bilateral upper extremities        Strength (Manual Muscle Testing)    Strength (Manual Muscle Testing) strength is WFL;bilateral upper extremities        Bed Mobility    [place], Sit to Supine moderate assist (50-74% patient effort);1 person assist;verbal cues     Verbal Cues (Sit to Supine) maintaining precautions;preparatory posture;technique      Assistive Device none     Comment (Bed Mobility) enter to the L        Sit to Stand Transfer    [place], Sit to Stand Transfer minimum assist (75% or more patient effort)     Verbal Cues hand placement;maintaining precautions;preparatory posture;safety;technique     Assistive Device walker, front-wheeled     Comment from EOB        Stand to Sit Transfer    [place], Stand to Sit Transfer minimum assist (75% or more patient effort)     Verbal Cues hand placement;maintaining precautions;preparatory posture;technique     Assistive Device walker, front-wheeled     Comment to EOB        Toilet Transfer    Comment rec'd ambulating out of bathroom with RN just completed transfer        Mobility Belt    Mobility Belt Used for All Out of Bed Activity no     Reason Mobility Belt Not Used spinal surgery        Balance    Static Sitting Balance WFL;sitting in chair     Static Standing Balance mild impairment;supported     Comment, Balance UE support on RW        Motor Skills    Functional Endurance Fair        Lower Body Dressing    Tasks don;socks     [place] Assistance dons/doffs right sock;dons/doffs left sock     [place] dependent (less than 25% patient effort)     Position supported sitting     Adaptive Equipment none     Comment unable to complete cross leg technique        Toileting    Comment completed prior to therapy arrival        BADL Safety/Performance    Impairments, BADL Safety/Performance balance;endurance/activity tolerance;pain     Skilled BADL Treatment/Intervention BADL process/adaptation training;compensatory training        AM-PAC (TM) - ADL (Current Function)    Putting on and taking off regular lower body clothing? 1 - Total     Bathing? 2 - A Lot     Toileting? 1 - Total     Putting on/taking off regular upper body clothing? 3 - A Little     How much help for taking care of personal grooming? 3 - A Little     Eating meals? 4 - None     AM-PAC (TM) ADL Score 14        Session Outcome     Daily Outcome Statement OT eval completed.  Pt presents with deficits in balance, endurance/activity tolerance and pain limiting return to PLOF.  Pt required min A for functional transfers and D for LB dressing.  [facility] 14.  skilled OT services to follow     Position at End of Session supine in bed     Safety Factors bed alarm;call light in reach     Status at End of Session personal items in reach;all needs met     Nursing Notified patient's performance;patient's position;patient's response to therapy/activity        Plan    Rehab Potential good, to achieve stated therapy goals     Therapy Frequency 3-5 times/wk     Planned Therapy Interventions activity tolerance training;BADL retraining;functional balance retraining;transfer/mobility retraining               OT Discharge Recommendations    Flowsheet Row Most Recent Value   OT Recommended Discharge Disposition skilled nursing facility at 03/12/2023 1348   Anticipated Equipment Needs At Discharge (OT) commode, 3-in-1, walker, front-wheeled, shower chair at 03/12/2023 1348   Patient/Family Therapy Goal Statement Return home at 03/12/2023 1348                  Education Documentation  Self-Care, taught by Buffy Delgado, OT at 3/12/2023  3:43 PM.  Learner: Family, Patient  Readiness: Acceptance  Method: Explanation  Response: Verbalizes Understanding  Comment: OT role, positioning, ADL compensatory strategies, safe transfer techniques, RW safety, spinal precautions          OT Goals    Flowsheet Row Most Recent Value   Bed Mobility Goal 1    Activity/Assistive Device bed mobility activities, all at 03/12/2023 1348   [place] modified independence at 03/12/2023 1348   Time Frame by discharge at 03/12/2023 1348   Progress/Outcome goal ongoing at 03/12/2023 1348   Transfer Goal 1    Activity/Assistive Device toilet at 03/12/2023 1348   [place] modified independence at 03/12/2023 1348   Time Frame by discharge at 03/12/2023 1348   Progress/Outcome goal ongoing at  03/12/2023 1348   Dressing Goal 1    Activity/Adaptive Equipment dressing skills, all at 03/12/2023 1348   [place] modified independence at 03/12/2023 1348   Time Frame by discharge at 03/12/2023 1348   Progress/Outcome goal ongoing at 03/12/2023 1348   Toileting Goal 1    Activity/Assistive Device toileting skills, all at 03/12/2023 1348   [place] modified independence at 03/12/2023 1348   Time Frame by discharge at 03/12/2023 1348   Progress/Outcome goal ongoing at 03/12/2023 1348

## 2023-03-12 NOTE — PLAN OF CARE
Problem: Adult Inpatient Plan of Care  Goal: Plan of Care Review  Outcome: Progressing  Flowsheets (Taken 3/12/2023 1011)  Progress: improving  Plan of Care Reviewed With: patient  Outcome Summary: OT cari completed

## 2023-03-12 NOTE — NURSING NOTE
Upon assessment patient states that she was having suicidal thoughts prior to coming into the hospital. Patient denies suicidal thoughts or thoughts of self harm at this time. Rn paged ortho resident, awaiting response. Patient agreeable to speaking with psychiatry. Patient sitting up in chair, chair alarm present, call bell in reach.

## 2023-03-12 NOTE — PROGRESS NOTES
Orthopedic Progress Note    Subjective     Pt seen and examined at bedside, resting comfortably. No acute events overnight. Pain well controlled. Denies numbness, or tingling. Pt reports the IV steroids helped her pain significantly.      Objective   Temp:  [36.3 °C (97.4 °F)-36.8 °C (98.2 °F)] 36.3 °C (97.4 °F)  Heart Rate:  [77-92] 78  Resp:  [16-18] 16  BP: (114-155)/(59-81) 114/65  SpO2:  [94 %-98 %] 94 %    Lab Results   Component Value Date    WBC 9.29 03/11/2023    HGB 12.2 03/11/2023    HCT 37.6 03/11/2023    MCV 88.3 03/11/2023     03/11/2023     Lab Results   Component Value Date    GLUCOSE 87 03/11/2023    CALCIUM 8.7 (L) 03/11/2023     03/11/2023    K 3.8 03/11/2023    CO2 25 03/11/2023     (H) 03/11/2023    BUN 10 03/11/2023    CREATININE 0.6 03/11/2023       General: AVSS, NAD  AAOx3    Spine:  Bilateral lower extremity dermatomes Intact  Bilateral myotomes intact  Negative clonus  Unable to rotate trunk secondary to significant lower back pain  2+ DP pulses bilaterally      Assessment   Patient is a 60-year-old female who was admitted for right lower extremity pain secondary to lumbar radiculopathy the setting of L5-S1 microdiscectomy by Dr. Traore on 3/9.      Plan   -Decadron 10 mg every 8 hours for 3 doses  -Standing Tylenol  -Oxycodone as needed  -FU pain management recs  -Out of bed with PT/OT  -Weightbearing as tolerated right lower extremity    Eric Davenport, DO  Orthopedic Surgery, PGY1

## 2023-03-13 PROCEDURE — 63600000 HC DRUGS/DETAIL CODE: Performed by: PHYSICAL THERAPIST

## 2023-03-13 PROCEDURE — 12000000 HC ROOM AND CARE MED/SURG

## 2023-03-13 PROCEDURE — 63600000 HC DRUGS/DETAIL CODE: Performed by: NURSE PRACTITIONER

## 2023-03-13 PROCEDURE — 63700000 HC SELF-ADMINISTRABLE DRUG: Performed by: PHYSICAL THERAPIST

## 2023-03-13 PROCEDURE — 63700000 HC SELF-ADMINISTRABLE DRUG: Performed by: NURSE PRACTITIONER

## 2023-03-13 RX ORDER — LORAZEPAM 1 MG/1
1 TABLET ORAL EVERY 6 HOURS PRN
Qty: 5 TABLET | Refills: 0 | Status: SHIPPED | OUTPATIENT
Start: 2023-03-13 | End: 2024-01-02

## 2023-03-13 RX ORDER — METHYLPREDNISOLONE 4 MG/1
4 TABLET ORAL SEE ADMIN INSTRUCTIONS
Qty: 21 TABLET | Refills: 0 | Status: CANCELLED
Start: 2023-03-14 | End: 2023-03-21

## 2023-03-13 RX ORDER — POLYETHYLENE GLYCOL 3350 17 G/17G
17 POWDER, FOR SOLUTION ORAL DAILY
Status: DISCONTINUED | OUTPATIENT
Start: 2023-03-13 | End: 2023-03-14 | Stop reason: HOSPADM

## 2023-03-13 RX ORDER — AMOXICILLIN 250 MG
1 CAPSULE ORAL 2 TIMES DAILY
Qty: 60 TABLET | Refills: 0 | COMMUNITY
Start: 2023-03-13 | End: 2024-01-02

## 2023-03-13 RX ORDER — OXYCODONE HYDROCHLORIDE 5 MG/1
10 TABLET ORAL EVERY 4 HOURS PRN
Status: DISCONTINUED | OUTPATIENT
Start: 2023-03-13 | End: 2023-03-14 | Stop reason: HOSPADM

## 2023-03-13 RX ORDER — LORAZEPAM 1 MG/1
1 TABLET ORAL EVERY 6 HOURS PRN
Qty: 5 TABLET | Refills: 0 | Status: CANCELLED | OUTPATIENT
Start: 2023-03-13 | End: 2023-03-18

## 2023-03-13 RX ORDER — AMOXICILLIN 250 MG
1 CAPSULE ORAL 2 TIMES DAILY
Status: DISCONTINUED | OUTPATIENT
Start: 2023-03-13 | End: 2023-03-14 | Stop reason: HOSPADM

## 2023-03-13 RX ORDER — POLYETHYLENE GLYCOL 3350 17 G/17G
17 POWDER, FOR SOLUTION ORAL DAILY
Qty: 3 PACKET | Refills: 0 | COMMUNITY
Start: 2023-03-13 | End: 2024-01-02

## 2023-03-13 RX ORDER — POLYETHYLENE GLYCOL 3350 17 G/17G
17 POWDER, FOR SOLUTION ORAL DAILY
Qty: 3 PACKET | Refills: 0 | Status: CANCELLED | COMMUNITY
Start: 2023-03-13 | End: 2023-03-16

## 2023-03-13 RX ORDER — METHYLPREDNISOLONE 4 MG/1
4 TABLET ORAL SEE ADMIN INSTRUCTIONS
Qty: 21 TABLET | Refills: 0
Start: 2023-03-13 | End: 2023-03-20

## 2023-03-13 RX ORDER — OXYCODONE HYDROCHLORIDE 5 MG/1
5-10 TABLET ORAL EVERY 4 HOURS PRN
Qty: 20 TABLET | Refills: 0 | Status: SHIPPED | OUTPATIENT
Start: 2023-03-13 | End: 2023-03-18

## 2023-03-13 RX ORDER — DEXAMETHASONE SODIUM PHOSPHATE 4 MG/ML
10 INJECTION, SOLUTION INTRA-ARTICULAR; INTRALESIONAL; INTRAMUSCULAR; INTRAVENOUS; SOFT TISSUE ONCE
Status: COMPLETED | OUTPATIENT
Start: 2023-03-13 | End: 2023-03-13

## 2023-03-13 RX ORDER — OXYCODONE HYDROCHLORIDE 5 MG/1
5-10 TABLET ORAL EVERY 4 HOURS PRN
Qty: 20 TABLET | Refills: 0 | Status: CANCELLED | OUTPATIENT
Start: 2023-03-13 | End: 2023-03-18

## 2023-03-13 RX ORDER — AMOXICILLIN 250 MG
1 CAPSULE ORAL 2 TIMES DAILY
Qty: 60 TABLET | Refills: 0 | Status: CANCELLED | COMMUNITY
Start: 2023-03-13 | End: 2023-04-12

## 2023-03-13 RX ADMIN — ACETAMINOPHEN 650 MG: 325 TABLET ORAL at 05:27

## 2023-03-13 RX ADMIN — DEXAMETHASONE SODIUM PHOSPHATE 10 MG: 4 INJECTION, SOLUTION INTRA-ARTICULAR; INTRALESIONAL; INTRAMUSCULAR; INTRAVENOUS; SOFT TISSUE at 11:56

## 2023-03-13 RX ADMIN — ACETAMINOPHEN 650 MG: 325 TABLET ORAL at 23:03

## 2023-03-13 RX ADMIN — LORAZEPAM 1 MG: 1 TABLET ORAL at 15:47

## 2023-03-13 RX ADMIN — ACETAMINOPHEN 650 MG: 325 TABLET ORAL at 11:55

## 2023-03-13 RX ADMIN — ROSUVASTATIN CALCIUM 40 MG: 40 TABLET, COATED ORAL at 18:04

## 2023-03-13 RX ADMIN — OXYCODONE HYDROCHLORIDE 5 MG: 5 TABLET ORAL at 08:37

## 2023-03-13 RX ADMIN — SENNOSIDES AND DOCUSATE SODIUM 1 TABLET: 50; 8.6 TABLET ORAL at 11:54

## 2023-03-13 RX ADMIN — KETOROLAC TROMETHAMINE 15 MG: 15 INJECTION, SOLUTION INTRAMUSCULAR; INTRAVENOUS at 07:27

## 2023-03-13 RX ADMIN — OXYCODONE HYDROCHLORIDE 10 MG: 5 TABLET ORAL at 14:28

## 2023-03-13 RX ADMIN — SENNOSIDES AND DOCUSATE SODIUM 1 TABLET: 50; 8.6 TABLET ORAL at 19:54

## 2023-03-13 RX ADMIN — ACETAMINOPHEN 650 MG: 325 TABLET ORAL at 18:04

## 2023-03-13 RX ADMIN — AMLODIPINE BESYLATE 10 MG: 10 TABLET ORAL at 08:34

## 2023-03-13 RX ADMIN — POLYETHYLENE GLYCOL 3350 17 G: 17 POWDER, FOR SOLUTION ORAL at 11:55

## 2023-03-13 RX ADMIN — THERA TABS 1 TABLET: TAB at 08:34

## 2023-03-13 NOTE — PLAN OF CARE
0949-Spoke with Lamonte at [facility], able to accept pt pending [employer] auth.  Lamonte stated that he will initiate [employer] auth.  Noemy Barrios, RN     1030- Spoke with pt to notify that [facility] is able to accept, is agreeable.  Noemy Barrios RN

## 2023-03-13 NOTE — PLAN OF CARE
Problem: Adult Inpatient Plan of Care  Goal: Plan of Care Review  Outcome: Progressing  Flowsheets (Taken 3/13/2023 0644)  Progress: improving  Plan of Care Reviewed With: patient  Outcome Summary: Pt resting in bed at start of shift,  reports mild to no pain. Discussed PRN and scheduled medication, pt verbalized understanding. Pt OOB A X 1 and walker. Bed alarm on, call bell within reach.  Goal: Patient-Specific Goal (Individualized)  Outcome: Progressing  Goal: Absence of Hospital-Acquired Illness or Injury  Outcome: Progressing  Goal: Optimal Comfort and Wellbeing  Outcome: Progressing  Goal: Readiness for Transition of Care  Outcome: Progressing     Problem: Skin Injury Risk Increased  Goal: Skin Health and Integrity  Outcome: Progressing     Problem: Fall Injury Risk  Goal: Absence of Fall and Fall-Related Injury  Outcome: Progressing     Problem: Mobility Impairment  Goal: Optimal Mobility  Outcome: Progressing     Problem: Self-Care Deficit  Goal: Improved Ability to Complete Activities of Daily Living  Outcome: Progressing   Plan of Care Review  Plan of Care Reviewed With: patient  Progress: improving  Outcome Summary: Pt resting in bed at start of shift,  reports mild to no pain. Discussed PRN and scheduled medication, pt verbalized understanding. Pt OOB A X 1 and walker. Bed alarm on, call bell within reach.

## 2023-03-13 NOTE — PROGRESS NOTES
Orthopedic Progress Note    Subjective     Pt seen and examined at bedside, resting comfortably. No acute events overnight. Pain well controlled. Denies numbness, or tingling. Was seen by PT yesterday who is recommending SNF. Pt says she is willing to attend rehab.      Objective   Temp:  [36.4 °C (97.6 °F)-36.6 °C (97.9 °F)] 36.6 °C (97.8 °F)  Heart Rate:  [69-95] 78  Resp:  [16] 16  BP: ()/(51-73) 96/51  SpO2:  [94 %-98 %] 96 %    Lab Results   Component Value Date    WBC 9.29 03/11/2023    HGB 12.2 03/11/2023    HCT 37.6 03/11/2023    MCV 88.3 03/11/2023     03/11/2023     Lab Results   Component Value Date    GLUCOSE 87 03/11/2023    CALCIUM 8.7 (L) 03/11/2023     03/11/2023    K 3.8 03/11/2023    CO2 25 03/11/2023     (H) 03/11/2023    BUN 10 03/11/2023    CREATININE 0.6 03/11/2023       General: AVSS, NAD  AAOx3    Spine:  Bilateral lower extremity dermatomes Intact  Bilateral myotomes intact  Improving bed mobility  2+ DP pulses bilaterally      Assessment   Patient is a 60-year-old female who was admitted for right lower extremity pain secondary to lumbar radiculopathy the setting of L5-S1 microdiscectomy by Dr. Traore on 3/9.      Plan   -Standing Tylenol  -Oxycodone as needed  -FU pain management recs  -Out of bed with PT/OT  -Weightbearing as tolerated right lower extremity  -PT recommending SNF placement    Eric Davenport, DO  Orthopedic Surgery, PGY1

## 2023-03-13 NOTE — PLAN OF CARE
Plan of Care Review  Plan of Care Reviewed With: patient, daughter  Progress: improving  Outcome Summary: Pt's oxycodone increased to 10mg.  Pt's pain controlled.  Pt was anxious.  Gave Ativan and provided active listening.  Pt ambulated in room with 1 assist with walker.  Took a shower.  Pt back in bed with alarm on.  Call bell in reach.  Pt waiting for authorization from [facility]

## 2023-03-14 VITALS
HEART RATE: 73 BPM | HEIGHT: 62 IN | TEMPERATURE: 99 F | OXYGEN SATURATION: 96 % | DIASTOLIC BLOOD PRESSURE: 64 MMHG | SYSTOLIC BLOOD PRESSURE: 120 MMHG | BODY MASS INDEX: 27.42 KG/M2 | RESPIRATION RATE: 18 BRPM | WEIGHT: 149 LBS

## 2023-03-14 PROCEDURE — 63700000 HC SELF-ADMINISTRABLE DRUG: Performed by: NURSE PRACTITIONER

## 2023-03-14 PROCEDURE — 63700000 HC SELF-ADMINISTRABLE DRUG: Performed by: PHYSICAL THERAPIST

## 2023-03-14 PROCEDURE — 97535 SELF CARE MNGMENT TRAINING: CPT | Mod: GO,U8

## 2023-03-14 PROCEDURE — 97116 GAIT TRAINING THERAPY: CPT | Mod: GP,U8

## 2023-03-14 RX ADMIN — ACETAMINOPHEN 650 MG: 325 TABLET ORAL at 05:12

## 2023-03-14 RX ADMIN — OXYCODONE HYDROCHLORIDE 10 MG: 5 TABLET ORAL at 08:42

## 2023-03-14 RX ADMIN — SENNOSIDES AND DOCUSATE SODIUM 1 TABLET: 50; 8.6 TABLET ORAL at 08:43

## 2023-03-14 RX ADMIN — AMLODIPINE BESYLATE 10 MG: 10 TABLET ORAL at 08:42

## 2023-03-14 RX ADMIN — POLYETHYLENE GLYCOL 3350 17 G: 17 POWDER, FOR SOLUTION ORAL at 08:43

## 2023-03-14 RX ADMIN — THERA TABS 1 TABLET: TAB at 08:43

## 2023-03-14 ASSESSMENT — COGNITIVE AND FUNCTIONAL STATUS - GENERAL
DRESSING REGULAR LOWER BODY CLOTHING: 3 - A LITTLE
CLIMB 3 TO 5 STEPS WITH RAILING: 3 - A LITTLE
DRESSING REGULAR UPPER BODY CLOTHING: 3 - A LITTLE
EATING MEALS: 4 - NONE
TOILETING: 3 - A LITTLE
MOVING TO AND FROM BED TO CHAIR: 3 - A LITTLE
HELP NEEDED FOR PERSONAL GROOMING: 3 - A LITTLE
WALKING IN HOSPITAL ROOM: 3 - A LITTLE
HELP NEEDED FOR BATHING: 3 - A LITTLE
STANDING UP FROM CHAIR USING ARMS: 3 - A LITTLE
AFFECT: WFL
AFFECT: WFL

## 2023-03-14 NOTE — PLAN OF CARE
Care Coordination Discharge Plan Note   Date: 3/14/2023    Time: 9:51 AM    Patient Name: Anna Pruett  Medical Record Number: [id number]  YOB: 1960  Room/BED: 4227    Discharge Assessment  Concerns to be Addressed: care coordination/care conferences, adjustment to diagnosis/illness, discharge planning  Anticipated Changes Related to Illness: inability to care for self    Concerns Comments: pt with increased anxiety and depression .it was difficult to receive a reply to my questions since pt  had to keep asking her dght to answer them.    Anticipated Discharge Plan  Anticipated Discharge Disposition: home with home health  Type of Home Care Services: home OT, home PT, nursing  Patient/Family Anticipated Services at Transition: skilled nursing      Patient Choice  Offered/Gave Vendor List: yes  Patient's Choice of Community Agency(s): [facility]    Patient and/or patient guardian/advocate was made aware of their right to choose a provider. A list of eligible providers was presented and reviewed with the patient and/or patient guardian/advocate in written and/or verbal form. The list delineates providers in the patient’s desired geographic area who are participating in the Medicare program and/or providers contracted with the patient’s primary insurance. The Medicare list and quality ratings were obtained from the Medicare.gov [medicare.gov] website.    Discharge Transportation   Does the patient need discharge transport arranged?: No          Continued Care and Services - Admitted Since 3/11/2023    Coordination has not been started for this encounter.         Spoke with pt regarding discharge plan, is now wanting to go home with home care instead of going to SNF.  Referral sent to [facility].  Call placed to Lamonte at [facility] to update.  Pt states that one of her children will transport her home.

## 2023-03-14 NOTE — PROGRESS NOTES
Patient: Anna Pruett  Location:  [address] [address]  MRN:  [id number]  Today's date:  3/14/2023    Lashae is a 63 y.o. female admitted on 3/11/2023 with Postoperative back pain [G89.18, M54.9]. Principal problem is Postoperative back pain.    Past Medical History  Lashae has a past medical history of Anxiety, Back pain, COVID-19, Depression, Hypertension, Lipid disorder, and Panic attacks.    History of Present Illness   Patient is a 63 y.o. female with a past medical history of hypertension, depression, anxiety, and lower back pain who was admitted for lumbar radiculopathy radiating to the right lower extremity.  Patient is known to Dr. Traore, who performed an L5/S1 right microdiscectomy 2 days ago.  Patient was feeling well initially after surgery however she began to notice worsening right lower extremity pain where she was unable to ambulate.      PT Vitals    Date/Time Pulse HR Source SpO2 Pt Activity O2 Therapy BP BP Location BP Method Pt Position [facility]   03/14/23 1000 73 Monitor 96 % At rest None (Room air) 120/64 Right upper arm Automatic Lying CMC      PT Pain    Date/Time Pain Type Location Rating: Rest Rating: Activity Radiation to [facility]   03/14/23 1000 Pain Assessment back 4 4 leg, right CMC          Prior Living Environment    Flowsheet Row Most Recent Value   People in Home child(lidia), adult   Current Living Arrangements home   Home Accessibility not wheelchair accessible   Living Environment Comment multi-level home with 4+4 [place] L railing, FFSU possible with stall shower        Prior Level of Function    Flowsheet Row Most Recent Value   Dominant Hand right   Ambulation independent   Transferring independent   Toileting independent   Bathing independent   Dressing independent   Eating independent   Prior Level of Function Comment previoulsy IND with mobility and self care, pt reporting requiring family assistance/limited mobility PTA 2/2 pain   Assistive Device Currently Used at Home shower  chair, stair glide, raised toilet, commode, 3-in-1  [ordered recliner]           PT Evaluation and Treatment - 03/14/23 0953        PT Time Calculation    Start Time 0953     Stop Time 1015     Time Calculation (min) 22 min        General Information    Document Type daily treatment/progress note     Mode of Treatment physical therapy     Position at Start of Session supine     Status at Start of Session agreeable to therapy;no issues or concerns identified by nurse prior to session        Precautions/Limitations/Impairments    Existing Precautions/Restrictions fall;spinal;weight bearing     Left LE Weight-Bearing Status weight-bearing as tolerated (WBAT)     Right LE Weight-Bearing Status weight-bearing as tolerated (WBAT)        Cognition/Psychosocial    Affect/Mental Status (Cognition) WFL     Orientation Status (Cognition) oriented x 3     Follows Commands (Cognition) follows one-step commands;over 90% accuracy;repetition of directions required     Safety Deficit (Cognition) minimal deficit;insight into deficits/self-awareness;safety precautions awareness;safety precautions follow-through/compliance     Comment, Cognition coopeartive with PT, requiring repitition of safety cues with handplacement and spinal precautions        Bed Mobility    [place], Roll Left supervision;verbal cues     [place], Sidelying to Sit supervision;verbal cues     Assistive Device bed rails     Comment (Bed Mobility) OOB to L via logroll tech        Mobility Belt    Mobility Belt Used for All Out of Bed Activity no     Reason Mobility Belt Not Used medical contraindication     Reason Mobility Belt Not Used s/p spine surgery        Transfers    Transfers car transfer        Sit to Stand Transfer    [place], Sit to Stand Transfer supervision;verbal cues     Verbal Cues hand placement;proper use of assistive device     Assistive Device walker, front-wheeled     Comment from EOB, from recliner        Stand to Sit Transfer     [place], Stand to Sit Transfer supervision;verbal cues     Verbal Cues hand placement;proper use of assistive device     Assistive Device walker, front-wheeled     Comment to recliner        Car Transfer    Transfer Technique sit-stand;stand-sit     [place], Car Transfer supervision;verbal cues     Verbal Cues hand placement;maintaining precautions;safety;technique     Assistive Device walker, front-wheeled     Comment ambulatory approach to/from passenger side with RW        Gait Training    [place], Gait supervision;verbal cues;increased time to complete     Assistive Device walker, front-wheeled     Distance in Feet 80 feet     Pattern (Gait) step-to     Deviations/Abnormal Patterns (Gait) derian decreased;weight shifting decreased;stride length decreased;gait speed decreased     Comment (Gait/Stairs) decreased wt shift and stance time on RLE, no evidence of RLE instability, pain mild per pt        Stairs Training    [place], Stairs close supervision;verbal cues     Assistive Device railing     Handrail Location (Stairs) left side (ascending);right side (ascending);left side (descending);right side (descending)     Number of Stairs 4     Stair Height 6 inches     Ascending Stairs Technique step-to-step     Descending Stairs Technique step-to-step     Comment able to complete with UHR, completed with R vs L ascending/descending lateral tech per home dispo requirements.  good strength in RLE to lead ascending with L railng and good RLE eccentric control to lower when descending wtih R railing. demonstrating sequencing and tech for railing and SPC.        Safety Issues, Functional Mobility    Impairments Affecting Function (Mobility) balance;pain;range of motion (ROM);strength;endurance/activity tolerance        Balance    Static Sitting Balance WFL;unsupported;sitting, edge of bed     Dynamic Sitting Balance WFL;supported;sitting in chair     Sit to Stand Dynamic Balance WFL;supported      Static Standing Balance WFL;supported   RW    Dynamic Standing Balance mild impairment;supported;asymmetrical weight shifting   RW    Comment, Balance decreased RLE wt shifting        Motor Skills    Functional Endurance fair, improving, pain limited        AM-PAC (TM) - Mobility (Current Function)    Turning from your back to your side while in a flat bed without using bedrails? 4 - None     Moving from lying on your back to sitting on the side of a flat bed without using bedrails? 3 - A Little     Moving to and from a bed to a chair? 3 - A Little     Standing up from a chair using your arms? 3 - A Little     To walk in a hospital room? 3 - A Little     Climbing 3-5 steps with a railing? 3 - A Little     AM-PAC (TM) Mobility Score 19        Session Outcome    Daily Outcome Statement pt participated in f/u PT treatment session, pt reporting pain in back and RLE has improving, requesting to d/c home with assist from daughter.  pt requiring SUP and v.c. for safety, tech, spinal precautions for bed mobility, funtional transfers with RW, HHD ambulation with RW, stair negotiation and car transfer.  pt requiring increased time and demonstrating antalgic gait with RLE however no limb instability.  siddharth not present for family training, pt reporting ability to instruct daughter on guarding and assist with RW management for stair negotiation.  pt cleared for d/c to home from and funtional mobility standpoint with assist from daughter.  pt declining need for  PT/OT.     Position at End of Session reclined;in chair     Status at End of Session chair alarm on;all needs met     Nursing Notified patient's performance;patient's response to therapy/activity        Plan    Rehab Potential good, to achieve stated therapy goals     Therapy Frequency 5 times/wk     Planned Therapy Interventions balance training;bed mobility training;gait training;home exercise program;patient/family education;ROM (range of motion);stair  training;strengthening;transfer training;other (see comments)   endurance training              PT Discharge Recommendations    Flowsheet Row Most Recent Value   PT Recommended Discharge Disposition home with assistance, home with home health  [OP PT when cleared by surgeon] at 03/14/2023 0953   Anticipated Equipment Needs at Discharge (PT) walker, front-wheeled  [issued this session] at 03/14/2023 0953   Patient/Family Therapy Goals Statement home at 03/14/2023 0953             Equipment Provided: Walker, with Wheels, Adult   Vendor: [employer]    Education Documentation  Treatment Plan, taught by Marry Xavier, PT at 3/14/2023 10:36 AM.  Learner: Patient  Readiness: Acceptance  Method: Explanation, Demonstration  Response: Verbalizes Understanding, Demonstrated Understanding  Comment: role of PT, PT POC, safety with mobility using RW, spinal precautions for safety, stair negotation-family guarding positioning, logroll tech          PT Goals    Flowsheet Row Most Recent Value   Bed Mobility Goal 1    Activity/Assistive Device rolling to left, rolling to right, scooting, sidelying to sit, sit to sidelying at 03/12/2023 1349   [place] modified independence at 03/12/2023 1349   Time Frame by discharge at 03/12/2023 1349   Progress/Outcome good progress toward goal, goal ongoing at 03/14/2023 0953   Transfer Goal 1    Activity/Assistive Device sit-to-stand/stand-to-sit, bed-to-chair/chair-to-bed, stand pivot  [AAD/LRAD vs no AD] at 03/12/2023 1349   [place] modified independence at 03/12/2023 1349   Time Frame by discharge at 03/12/2023 1349   Progress/Outcome good progress toward goal, goal ongoing at 03/14/2023 0953   Gait Training Goal 1    Activity/Assistive Device gait (walking locomotion)  [AAD/LRAD vs no AD] at 03/12/2023 1349   [place] modified independence at 03/12/2023 1349   Distance 150 at 03/12/2023 1349   Time Frame by discharge at 03/12/2023 1349    Progress/Outcome good progress toward goal, goal ongoing at 03/14/2023 0953   Stairs Goal 1    Activity/Assistive Device ascending stairs, descending stairs, using handrail, left, step-over step, step-to-step, using handrail, right, cane, straight at 03/12/2023 1349   [place] modified independence at 03/12/2023 1349   Number of Stairs 8 at 03/12/2023 1349   Time Frame by discharge at 03/12/2023 1349   Progress/Outcome good progress toward goal, goal ongoing at 03/14/2023 0973

## 2023-03-14 NOTE — PROGRESS NOTES
Patient:  Anna Pruett  Location:  [address] [address]  MRN:  [id number]  Today's date:  3/14/2023    Lashae is a 63 y.o. female admitted on 3/11/2023 with Postoperative back pain [G89.18, M54.9]. Principal problem is Postoperative back pain.    Past Medical History  Lashae has a past medical history of Anxiety, Back pain, COVID-19, Depression, Hypertension, Lipid disorder, and Panic attacks.    History of Present Illness   Patient is a 63 y.o. female with a past medical history of hypertension, depression, anxiety, and lower back pain who was admitted for lumbar radiculopathy radiating to the right lower extremity.  Patient is known to Dr. Traore, who performed an L5/S1 right microdiscectomy 2 days ago.  Patient was feeling well initially after surgery however she began to notice worsening right lower extremity pain where she was unable to ambulate.      OT Vitals    Date/Time Pulse HR Source SpO2 Pt Activity O2 Therapy BP BP Location BP Method Pt Position [facility]   03/14/23 1000 73 Monitor 96 % At rest None (Room air) 120/64 Right upper arm Automatic Lying CMC      OT Pain    Date/Time Pain Type Location Rating: Rest Rating: Activity Radiation to [facility]   03/14/23 1000 Pain Assessment back 4 4 leg, right CMC          Prior Living Environment    Flowsheet Row Most Recent Value   People in Home child(lidia), adult   Current Living Arrangements home   Home Accessibility not wheelchair accessible   Living Environment Comment multi-level home with 4+4 [place] L railing, FFSU possible with stall shower        Prior Level of Function    Flowsheet Row Most Recent Value   Dominant Hand right   Ambulation independent   Transferring independent   Toileting independent   Bathing independent   Dressing independent   Eating independent   Prior Level of Function Comment previoulsy IND with mobility and self care, pt reporting requiring family assistance/limited mobility PTA 2/2 pain   Assistive Device Currently Used at Home shower  chair, stair glide, raised toilet, commode, 3-in-1  [ordered recliner]        Occupational Profile    Flowsheet Row Most Recent Value   Reason for Services/Referral Diminished ADL status s/p fall   Successful Occupations Retired   Performance Patterns +   Environmental Supports and Barriers Daughters are very support         OT Evaluation and Treatment - 03/14/23 0954        OT Time Calculation    Start Time 0954     Stop Time 1008     Time Calculation (min) 14 min        General Information    Document Type daily treatment/progress note     Mode of Treatment occupational therapy     Position at Start of Session supine     Status at Start of Session no issues or concerns identified by nurse prior to session;agreeable to therapy        Precautions/Limitations/Impairments    Existing Precautions/Restrictions fall;spinal;weight bearing     Left LE Weight-Bearing Status weight-bearing as tolerated (WBAT)     Right LE Weight-Bearing Status weight-bearing as tolerated (WBAT)        Cognition/Psychosocial    Affect/Mental Status (Cognition) WFL     Orientation Status (Cognition) oriented x 3     Follows Commands (Cognition) follows one-step commands;over 90% accuracy;repetition of directions required;increased processing time needed     Safety Deficit (Cognition) minimal deficit;awareness of need for assistance;insight into deficits/self-awareness;safety precautions awareness;problem-solving     Comment, Cognition pleasant and cooperative. benefits from inc time /cues for spinal precautions and hand placement for safety. dec insight into insight     Cognitive Interventions/Strategies occupation/activity based interventions        Bed Mobility    [place], Roll Left supervision     [place], Sidelying to Sit supervision     Comment (Bed Mobility) OOB to L, log roll        Mobility Belt    Mobility Belt Used for All Out of Bed Activity no     Reason Mobility Belt Not Used medical contraindication     Reason  Mobility Belt Not Used s/p spine surgery        Sit to Stand Transfer    [place], Sit to Stand Transfer supervision;verbal cues     Verbal Cues hand placement;technique;safety     Assistive Device walker, front-wheeled     Comment from EOB        Stand to Sit Transfer    [place], Stand to Sit Transfer supervision;verbal cues     Verbal Cues safety;technique;hand placement     Assistive Device walker, front-wheeled     Comment to recliner        Toilet Transfer    Transfer Technique stand-sit;sit-stand     [place], Toilet Transfer supervision     Verbal Cues technique;safety     Assistive Device grab bars/safety frame     Comment grab bar to AUDREY reports has window sill to utilize at home        Safety Issues, Functional Mobility    Impairments Affecting Function (Mobility) endurance/activity tolerance;balance;pain;strength        Balance    Static Sitting Balance WFL;sitting, edge of bed     Dynamic Sitting Balance WFL;sitting, edge of bed     Sit to Stand Dynamic Balance WFL     Static Standing Balance WFL;supported     Dynamic Standing Balance mild impairment;supported     Comment, Balance RW in stance        Motor Skills    Functional Endurance fair, improving        Lower Body Dressing    Tasks socks     [place] supervision     Position edge of bed sitting     Comment figure 4        Toileting    Comment no needs at time of session        BADL Safety/Performance    Impairments, BADL Safety/Performance balance;endurance/activity tolerance;strength;range of motion     Skilled BADL Treatment/Intervention BADL process/adaptation training     Progress in BADL Status improvement noted        ADL Interventions    Energy Conservation Techniques activity adapted to sitting;activity pacing encouraged     Self-Care (BADL) Promotion activity pacing encouraged;activity adapted to sitting;best position for BADL determined        AM-PAC (TM) - ADL (Current Function)    Putting on and taking off regular lower  body clothing? 3 - A Little     Bathing? 3 - A Little     Toileting? 3 - A Little     Putting on/taking off regular upper body clothing? 3 - A Little     How much help for taking care of personal grooming? 3 - A Little     Eating meals? 4 - None     AM-PAC (TM) ADL Score 19        Session Outcome    Daily Outcome Statement OT follow up session complete. [facility] 19. Pt making good progress towards OT goals with dec pain this session. Pt completes bed mobility with SUP and log roll technique. Pt completes STS and short household distance mobility with SUP and RW. Pt completes toilet tx with SUP and grab bar. Pt completes LB dressing with SUP utilizing figure 4 technique. Pt continues to be limited by slight pain and deficits in balance, endurance and strength. Pt would benefit from continued OT to maximize IND c ADLs and safe functional mobility. Rec d/c home with inc assist + home health when medically stable     Position at End of Session in therapy gym   with PT    Status at End of Session --   with PT    Nursing Notified --   PT to complete handoff              OT Discharge Recommendations    Flowsheet Row Most Recent Value   OT Recommended Discharge Disposition home with assistance, home with home health at 03/14/2023 0954   Anticipated Equipment Needs At Discharge (OT) commode, 3-in-1, walker, front-wheeled, shower chair at 03/12/2023 1348   Patient/Family Therapy Goal Statement Return home at 03/12/2023 1348                  Education Documentation  Self-Care, taught by Coco Dunne OT at 3/14/2023 12:30 PM.  Learner: Patient  Readiness: Acceptance  Method: Explanation  Response: Verbalizes Understanding  Comment: OT POC, safety/pacing, log roll bed mobility, seated ADLs, figure 4 LB dressing, spinal precautions          OT Goals    Flowsheet Row Most Recent Value   Bed Mobility Goal 1    Activity/Assistive Device bed mobility activities, all at 03/12/2023 1348   [place] modified independence at  03/12/2023 1348   Time Frame by discharge at 03/12/2023 1348   Progress/Outcome goal ongoing at 03/14/2023 0954   Transfer Goal 1    Activity/Assistive Device toilet at 03/12/2023 1348   [place] modified independence at 03/12/2023 1348   Time Frame by discharge at 03/12/2023 1348   Progress/Outcome goal ongoing at 03/14/2023 0954   Dressing Goal 1    Activity/Adaptive Equipment dressing skills, all at 03/12/2023 1348   [place] modified independence at 03/12/2023 1348   Time Frame by discharge at 03/12/2023 1348   Progress/Outcome goal ongoing at 03/14/2023 0954   Toileting Goal 1    Activity/Assistive Device toileting skills, all at 03/12/2023 1348   [place] modified independence at 03/12/2023 1348   Time Frame by discharge at 03/12/2023 1348   Progress/Outcome goal ongoing at 03/14/2023 0954

## 2023-03-14 NOTE — PLAN OF CARE
Problem: Adult Inpatient Plan of Care  Goal: Plan of Care Review  Outcome: Progressing  Flowsheets (Taken 3/13/2023 2201)  Progress: improving  Plan of Care Reviewed With: patient  Outcome Summary: Pt resting in bed at start of shift, reports mild increase in pain sfter shower today. Discussed PRN and scheduled medications, pt verbalized understanding. Pt OOB with A X 1 and walker for support to bathroom. Bed alarm on, call bell within reach.  Goal: Patient-Specific Goal (Individualized)  Outcome: Progressing  Goal: Absence of Hospital-Acquired Illness or Injury  Outcome: Progressing  Goal: Optimal Comfort and Wellbeing  Outcome: Progressing  Goal: Readiness for Transition of Care  Outcome: Progressing     Problem: Skin Injury Risk Increased  Goal: Skin Health and Integrity  Outcome: Progressing     Problem: Fall Injury Risk  Goal: Absence of Fall and Fall-Related Injury  Outcome: Progressing     Problem: Mobility Impairment  Goal: Optimal Mobility  Outcome: Progressing     Problem: Self-Care Deficit  Goal: Improved Ability to Complete Activities of Daily Living  Outcome: Progressing   Plan of Care Review  Plan of Care Reviewed With: patient  Progress: improving  Outcome Summary: Pt resting in bed at start of shift, reports mild increase in pain sfter shower today. Discussed PRN and scheduled medications, pt verbalized understanding. Pt OOB with A X 1 and walker for support to bathroom. Bed alarm on, call bell within reach.

## 2023-03-14 NOTE — PROGRESS NOTES
3/15/23 - 1100 - [facility]  Referral received and chart reviewed  Spoke with patient in room and is agreeable to services with [facility]  Demographics verified  Referral completed  Maribell Holliday RN [facility]

## 2023-03-14 NOTE — PROGRESS NOTES
Orthopedic Progress Note    Subjective     Pt seen and examined at bedside, resting comfortably. No acute events overnight. Denies numbness, or tingling. Pain continuing to improve. Pt wishes to go home today instead of SNF.      Objective   Temp:  [36.4 °C (97.6 °F)-37.2 °C (99 °F)] 37.2 °C (99 °F)  Heart Rate:  [65-80] 72  Resp:  [18-20] 18  BP: (117-131)/(55-74) 117/74  SpO2:  [98 %-99 %] 99 %    Lab Results   Component Value Date    WBC 9.29 03/11/2023    HGB 12.2 03/11/2023    HCT 37.6 03/11/2023    MCV 88.3 03/11/2023     03/11/2023     Lab Results   Component Value Date    GLUCOSE 87 03/11/2023    CALCIUM 8.7 (L) 03/11/2023     03/11/2023    K 3.8 03/11/2023    CO2 25 03/11/2023     (H) 03/11/2023    BUN 10 03/11/2023    CREATININE 0.6 03/11/2023       General: AVSS, NAD  AAOx3    Spine:  Bilateral lower extremity dermatomes Intact  Bilateral myotomes intact  Improving bed mobility  2+ DP pulses bilaterally      Assessment   Patient is a 60-year-old female who was admitted for right lower extremity pain secondary to lumbar radiculopathy the setting of L5-S1 microdiscectomy by Dr. Traore on 3/9.      Plan   -Standing Tylenol  -Oxycodone as needed  -Out of bed with PT/OT  -Weightbearing as tolerated right lower extremity  -Likely DC home today    Eric Davenport, DO  Orthopedic Surgery, PGY1

## 2023-04-02 NOTE — DISCHARGE SUMMARY
Ortho Discharge Summary    Admitting Provider: Lyle Traore Jr., MD  Discharge Provider: No att. providers found  Primary Care Physician at Discharge: Dilcia Blackburn, JERAMY [phone]     Admission Date: 3/11/2023     Discharge Date: 3/14/2023  Primary Discharge Diagnosis  Postoperative back pain      Discharge Disposition  [facility]  Code Status at Discharge: Prior    Discharge Medications     Medication List      START taking these medications    acetaminophen 325 mg tablet  Commonly known as: TYLENOL  Take 2 tablets (650 mg total) by mouth every 6 (six) hours.  Dose: 650 mg     polyethylene glycol 17 gram packet  Commonly known as: MIRALAX  Take 17 g by mouth daily for 3 days.  Dose: 17 g     sennosides-docusate sodium 8.6-50 mg  Commonly known as: SENOKOT-S  Take 1 tablet by mouth 2 (two) times a day.  Dose: 1 tablet        CONTINUE taking these medications    amLODIPine 10 mg tablet  Commonly known as: NORVASC  Take 10 mg by mouth daily.  Dose: 10 mg     ketorolac 10 mg tablet  Commonly known as: TORADOL  Take 10 mg by mouth every 6 (six) hours as needed.  Dose: 10 mg     LORazepam 1 mg tablet  Commonly known as: ATIVAN  Take 1 tablet (1 mg total) by mouth every 6 (six) hours as needed for anxiety for up to 5 days.  Dose: 1 mg     mirtazapine 30 mg tablet  Commonly known as: REMERON  Take 30 mg by mouth nightly.  Dose: 30 mg     multivitamin tablet  Take by mouth daily.     rosuvastatin 40 mg tablet  Commonly known as: CRESTOR  Take 40 mg by mouth daily.  Dose: 40 mg     sertraline 100 mg tablet  Commonly known as: ZOLOFT  Take 100 mg by mouth daily.  Dose: 100 mg        STOP taking these medications    oxyCODONE 5 mg immediate release tablet  Commonly known as: ROXICODONE        ASK your doctor about these medications    methylPREDNISolone 4 mg tablet  Commonly known as: MEDROL DOSEPACK  Take 1 tablet (4 mg total) by mouth See admin instr for 7 days. Use as directed by package  instructions  Dose: 4 mg  Ask about: Should I take this medication?     oxyCODONE 5 mg immediate release tablet  Commonly known as: ROXICODONE  Take 1-2 tablets (5-10 mg total) by mouth every 4 (four) hours as needed for pain for up to 5 days. 5mg for moderate pain, 10mg for severe pain  Dose: 5-10 mg  Ask about: Should I take this medication?            Outpatient Follow-Up  Encounter Information    This patient does not currently have any appointments scheduled.       Follow-up in the office    DETAILS OF HOSPITAL STAY    Presenting Problem/History of Present Illness  Postoperative back pain [G89.18, M54.9]      Hospital Course  She underwent an L5-S1 discectomy was able to be discharged home that day was doing well then developed severe back pain bilateral leg pain no sign of cauda equina syndrome no leg weakness.  No sign of infection.  She continue with pain management physical therapy and was discharged to rehab with instructions to follow-up in the office in 2 weeks    Operative Procedures Performed    Consults:

## 2023-04-25 ENCOUNTER — APPOINTMENT (RX ONLY)
Dept: URBAN - METROPOLITAN AREA CLINIC 374 | Facility: CLINIC | Age: 63
Setting detail: DERMATOLOGY
End: 2023-04-25

## 2023-04-25 PROBLEM — D23.61 OTHER BENIGN NEOPLASM OF SKIN OF RIGHT UPPER LIMB, INCLUDING SHOULDER: Status: ACTIVE | Noted: 2023-04-25

## 2023-04-25 PROCEDURE — ? COUNSELING

## 2023-04-25 PROCEDURE — 99202 OFFICE O/P NEW SF 15 MIN: CPT

## 2023-04-25 PROCEDURE — ? DEFER

## 2023-04-25 PROCEDURE — ? CPT CODE GENERATOR

## 2023-04-25 NOTE — HPI: EVALUATION OF SKIN LESION(S)
details…
What Type Of Note Output Would You Prefer (Optional)?: Bullet Format
Hpi Title: Evaluation of a Skin Lesion
How Severe Are Your Spot(S)?: mild
Have Your Spot(S) Been Treated In The Past?: has not been treated

## 2023-04-25 NOTE — PROCEDURE: CPT CODE GENERATOR
Hydrocortisone Acetate (J-Code:  Price: 0.00
How Many Lesions Are You Destroying?: Less than 15
Include Pathology Charges?: No
Fee Schedule Discount For Cash Pay Patients In % Off Of Fee Schedule: 0
Additional J Code Units: 1
Detail Level: None
Anticipated Depth And Size Of Soft Tissue Excision (Required): Subcutaneous, Less than 3 cm
Fee Schedule Discount In % Off Of Fee Schedule: Standard Fee Schedule as Entered in Pricing Tab
First Biopsy Type: Tangential Biopsy
Location (Required): Extremities
First Procedure You Would Like A Quote For?: Benign Excision
Which Photosensitizer Was Used?: Levulan
Anticipated Depth And Size Of Soft Tissue Excision (Required): Subcutaneous, Less than 2 cm
Anticipated Depth And Size Of Soft Tissue Excision (Required): Subcutaneous, Less than 1.5 cm
Number Of Lesions Injected: Less than 8 lesions
Anticipated Excised Diameter (Required): 0.6 - 1.0 cm

## 2024-01-02 ENCOUNTER — APPOINTMENT (EMERGENCY)
Dept: RADIOLOGY | Facility: HOSPITAL | Age: 64
End: 2024-01-02
Payer: COMMERCIAL

## 2024-01-02 ENCOUNTER — HOSPITAL ENCOUNTER (EMERGENCY)
Facility: HOSPITAL | Age: 64
Discharge: HOME | End: 2024-01-02
Attending: EMERGENCY MEDICINE
Payer: COMMERCIAL

## 2024-01-02 VITALS
WEIGHT: 149 LBS | HEIGHT: 62 IN | DIASTOLIC BLOOD PRESSURE: 77 MMHG | TEMPERATURE: 98.4 F | RESPIRATION RATE: 16 BRPM | OXYGEN SATURATION: 99 % | BODY MASS INDEX: 27.42 KG/M2 | SYSTOLIC BLOOD PRESSURE: 140 MMHG | HEART RATE: 63 BPM

## 2024-01-02 DIAGNOSIS — M54.16 LUMBAR RADICULOPATHY: Primary | ICD-10-CM

## 2024-01-02 PROCEDURE — 63700000 HC SELF-ADMINISTRABLE DRUG: Performed by: PHYSICIAN ASSISTANT

## 2024-01-02 PROCEDURE — 99283 EMERGENCY DEPT VISIT LOW MDM: CPT | Mod: 25,U5

## 2024-01-02 PROCEDURE — 3E0233Z INTRODUCTION OF ANTI-INFLAMMATORY INTO MUSCLE, PERCUTANEOUS APPROACH: ICD-10-PCS | Performed by: EMERGENCY MEDICINE

## 2024-01-02 PROCEDURE — 72100 X-RAY EXAM L-S SPINE 2/3 VWS: CPT

## 2024-01-02 PROCEDURE — 63600000 HC DRUGS/DETAIL CODE: Performed by: PHYSICIAN ASSISTANT

## 2024-01-02 PROCEDURE — 96372 THER/PROPH/DIAG INJ SC/IM: CPT | Mod: 59

## 2024-01-02 RX ORDER — PREDNISONE 20 MG/1
60 TABLET ORAL ONCE
Status: COMPLETED | OUTPATIENT
Start: 2024-01-02 | End: 2024-01-02

## 2024-01-02 RX ORDER — MORPHINE SULFATE 10 MG/.5ML
10 SOLUTION ORAL ONCE
Status: COMPLETED | OUTPATIENT
Start: 2024-01-02 | End: 2024-01-02

## 2024-01-02 RX ORDER — KETOROLAC TROMETHAMINE 30 MG/ML
30 INJECTION, SOLUTION INTRAMUSCULAR; INTRAVENOUS ONCE
Status: COMPLETED | OUTPATIENT
Start: 2024-01-02 | End: 2024-01-02

## 2024-01-02 RX ORDER — CYCLOBENZAPRINE HCL 10 MG
10 TABLET ORAL ONCE
Status: COMPLETED | OUTPATIENT
Start: 2024-01-02 | End: 2024-01-02

## 2024-01-02 RX ORDER — OXYCODONE AND ACETAMINOPHEN 5; 325 MG/1; MG/1
1 TABLET ORAL EVERY 6 HOURS PRN
Qty: 12 TABLET | Refills: 0 | Status: SHIPPED | OUTPATIENT
Start: 2024-01-02 | End: 2024-01-05

## 2024-01-02 RX ADMIN — PREDNISONE 60 MG: 20 TABLET ORAL at 09:19

## 2024-01-02 RX ADMIN — CYCLOBENZAPRINE 10 MG: 10 TABLET, FILM COATED ORAL at 09:20

## 2024-01-02 RX ADMIN — KETOROLAC TROMETHAMINE 30 MG: 30 INJECTION INTRAMUSCULAR; INTRAVENOUS at 09:20

## 2024-01-02 RX ADMIN — MORPHINE SULFATE 10 MG: 100 SOLUTION ORAL at 09:20

## 2024-01-02 ASSESSMENT — ENCOUNTER SYMPTOMS
CONFUSION: 0
VOMITING: 0
AGITATION: 0
DYSURIA: 0
ABDOMINAL PAIN: 0
COUGH: 0
SHORTNESS OF BREATH: 0
BACK PAIN: 1
ARTHRALGIAS: 0
FEVER: 0
COLOR CHANGE: 0
PALPITATIONS: 0
HEMATURIA: 0
SORE THROAT: 0
CHILLS: 0
EYE PAIN: 0
SEIZURES: 0

## 2024-01-02 NOTE — DISCHARGE INSTRUCTIONS
Call Dr. Traore tomorrow. You should schedule an appointment for tomorrow or Friday in the office.    Continue the medrol steroid pack as prescribed.    Continue gabapentin.    Use ice or heat to the area as needed.    Take the Medrol Dose pack as prescribed.    You may use a Lidocaine patch over the counter Salonpas (Lidocaine topical patch).    Take ibuprofen 600mg every 6 hours for pain. Take with food. Stop taking for stomach upset.     Return to the ED for worsening of symptoms or any problems or concerns.  It is very important to follow up with your healthcare provider for re-evaluation.

## 2024-01-02 NOTE — CONSULTS
Orthopedic Surgery Consult Note    Subjective     Anna Pruett is a 63 y.o. female who  No admission diagnoses are documented for this encounter..       Medical History:   Past Medical History:   Diagnosis Date   • Anxiety    • Back pain    • COVID-19    • Depression    • Hypertension    • Lipid disorder    • Panic attacks        Surgical History:   Past Surgical History:   Procedure Laterality Date   •  SECTION     • EPIDURAL STEROID INJECTION INTERLAMINAR THORACIC     • FRACTURE SURGERY Left     ankle ORIF   • ROTATOR CUFF REPAIR         Social History:   Social History     Social History Narrative   • Not on file       Family History:   Family History   Problem Relation Age of Onset   • Heart disease Biological Mother        Allergies: Trazodone and Tree nut    Current Inpatient Medications   Medication Dose Route Frequency Provider Last Rate Last Admin   • cyclobenzaprine (FLEXERIL) tablet 10 mg  10 mg oral Once Lauren Zuniga PA C       • ketorolac (TORADOL) injection 30 mg  30 mg intramuscular Once Lauren Zuniga PA C       • morphine concentrate 10 mg/0.5 mL concentrated solution 10 mg  10 mg oral Once Lauren Zuniga PA C       • predniSONE (DELTASONE) tablet 60 mg  60 mg oral Once Lauren Zuniga PA C            Medication List      ASK your doctor about these medications    amLODIPine 10 mg tablet  Commonly known as: NORVASC  Take 10 mg by mouth daily.  Dose: 10 mg     rosuvastatin 40 mg tablet  Commonly known as: CRESTOR  Take 40 mg by mouth daily.  Dose: 40 mg          Review of Systems  See HPI    Objective   Labs  CBC Results       23     1118 1106    WBC 9.29 4.50    RBC 4.26 4.28    HGB 12.2 12.2    HCT 37.6 37.9    MCV 88.3 88.6    MCH 28.6 28.5    MCHC 32.4 32.2     294         BMP Results       23     1118 1106     140    K 3.8 4.4    Cl 112 108    CO2 25 24    Glucose 87 85    BUN 10 9    Creatinine 0.6 0.7    Calcium 8.7 8.7    Anion Gap 4 8    EGFR  >60.0 >60.0         Comment for K at 1118 on 03/11/23: Results obtained on plasma. Plasma Potassium values may be up to 0.4 mEQ/L less than serum values. The differences may be greater for patients with high platelet or white cell counts.             Imaging  ***      Physical Exam  Temp:  [36.9 °C (98.4 °F)] 36.9 °C (98.4 °F)  Heart Rate:  [86] 86  Resp:  [18] 18  BP: (119)/(81) 119/81  SpO2:  [98 %] 98 %     General: AVSS, NAD  Head: NC/AT  Resp: no labored breathing  Neuro: awake, alert  MSK:     C-T-L Spine:  -No palpable step-offs or masses  -No TTP paraspinally or over SP  -Full painless cervical ROM  -*** Motor strength 5/5 B/L    RUE:  -Skin intact, no abrasions, no lacerations   -Compartments soft and compressible  -No TTP of bony prominences  -No pain or crepitus with ROM of shoulder/elbow/wrist/fingers  -No palpable defect to Fingers/Hand/Wrist/Forearm/Elbow/Humerus/Shoulder/Clavicle  -SILT median/ulnar/radial  -Motor intact to axillary/musculocutaneous/radial/ulnar/median/AIN/PIN  -Radial Pulse 2+  -Hand wwp, BCR    LUE:  -Skin intact, no abrasions, no lacerations   -Compartments soft and compressible  -No TTP of bony prominences  -No pain or crepitus with ROM of shoulder/elbow/wrist/fingers  -No palpable defect to Fingers/Hand/Wrist/Forearm/Elbow/Humerus/Shoulder/Clavicle  -SILT median/ulnar/radial  -Motor intact to axillary/musculocutaneous/radial/ulnar/median/AIN/PIN  -Radial Pulse 2+  -Hand wwp, BCR    RLE:  -Skin intact, no abrasions, no lacerations   -Compartments soft and compressible  -No TTP of bony prominences  -No pain or crepitus with ROM of hip/knee/ankle/toes  -No palpable defects to toes/foot/ankle/tibia/knee/femur  -*** Logroll  -*** to SLR  -No pain with Axial load  -No pain with axial compression of flexed hip and knee  -No pain with compression at ASIS  -SILT in sural/saphenous/DP/SP/tibial distributions  -Motor intact to quad/hamstrings/EHL/TA/peroneals/GSC  -DP Pulse 2+  -Foot wwp,  BCR    LLE:  -Skin intact, no abrasions, no lacerations   -Compartments soft and compressible  -No TTP of bony prominences  -No pain or crepitus with ROM of hip/knee/ankle/toes  -No palpable defects to toes/foot/ankle/tibia/knee/femur  -*** Logroll  -*** to SLR  -No pain with Axial load  -No pain with axial compression of flexed hip and knee  -No pain with compression at ASIS  -SILT in sural/saphenous/DP/SP/tibial distributions  -Motor intact to quad/hamstrings/EHL/TA/peroneals/GSC  -DP Pulse 2+  -Foot wwp, BCR    Procedure: ***    Assessment   63 y.o. female being consulted for ***       Plan   -***No orthopedic surgical intervention indicated at this time   -Recommend PT/OT  -Multimodal pain control and lidocaine patches as needed  -Weightbearing ***  -Discussed with  *** who agrees with plan    Talia Gauthier DO   Orthopedic Surgery Resident

## 2024-01-02 NOTE — ED ATTESTATION NOTE
The patient was evaluated and managed by the PA/NP/resident.  I have discussed the case with the PA/NP/resident.  I am in agreement with the ED workup and treatment plan.  I will continue to be available for consultation as needed.     Godshall, Duane K, MD  01/02/24 0752

## 2024-01-02 NOTE — ED PROVIDER NOTES
Emergency Medicine Note  HPI   HISTORY OF PRESENT ILLNESS     The patient is a 63-year-old female with past medical history including hypertension, hyperlipidemia anxiety, and chronic lumbar back pain who presents today for evaluation of ongoing lumbar back pain with radiation to the right lower extremity.  Patient states she previously had a L5-S1 discectomy with Dr. Aidan Fleming in March of last year.  She states over the past few weeks she has had increasing back pain which is similar to her symptoms prior to her surgery.  She reports over the past few days she has had a few episodes of urinary incontinence.  She denies any paresthesias.  She reports pain radiates from her upper lumbar spine to the right buttock and down the right thigh.  She denies any saddle anesthesia, fecal incontinence, or fevers.  Patient was seen at [facility] 2 days ago and given a Medrol pack and gabapentin.  She states this is not helping.  She is able to ambulate.  She reports she had an MRI done a few weeks ago at the prior office but does not yet have the results.      History provided by:  Patient   used: No          Patient History   PAST HISTORY     Reviewed from Nursing Triage:  Tobacco  Allergies  Meds  Problems  Med Hx  Surg Hx  Fam Hx  Soc   Hx      Past Medical History:   Diagnosis Date   • Anxiety    • Back pain    • COVID-19    • Depression    • Hypertension    • Lipid disorder    • Panic attacks        Past Surgical History:   Procedure Laterality Date   •  SECTION     • EPIDURAL STEROID INJECTION INTERLAMINAR THORACIC     • FRACTURE SURGERY Left     ankle ORIF   • ROTATOR CUFF REPAIR         Family History   Problem Relation Age of Onset   • Heart disease Biological Mother        Social History     Tobacco Use   • Smoking status: Every Day     Packs/day: .5     Types: Cigarettes   • Smokeless tobacco: Never   Vaping Use   • Vaping Use: Never used   Substance Use  Topics   • Alcohol use: Not Currently   • Drug use: Never         Review of Systems   REVIEW OF SYSTEMS     Review of Systems   Constitutional: Negative for chills and fever.   HENT: Negative for ear pain and sore throat.    Eyes: Negative for pain and visual disturbance.   Respiratory: Negative for cough and shortness of breath.    Cardiovascular: Negative for chest pain and palpitations.   Gastrointestinal: Negative for abdominal pain and vomiting.   Endocrine: Negative for polyuria.   Genitourinary: Negative for dysuria and hematuria.   Musculoskeletal: Positive for back pain. Negative for arthralgias.   Skin: Negative for color change and rash.   Neurological: Negative for seizures and syncope.   Psychiatric/Behavioral: Negative for agitation and confusion.         VITALS     ED Vitals    Date/Time Temp Pulse Resp BP SpO2 [facility]   01/02/24 1056 -- 63 16 140/77 99 % RAB   01/02/24 0713 36.9 °C (98.4 °F) 86 18 119/81 98 % SB        Pulse Ox %: 98 % (01/02/24 0726)  Pulse Ox Interpretation: Normal (01/02/24 0726)           Physical Exam   PHYSICAL EXAM     Physical Exam  Vitals and nursing note reviewed.   Constitutional:       General: She is not in acute distress.     Appearance: She is well-developed. She is not diaphoretic.   HENT:      Head: Normocephalic and atraumatic.      Nose: Nose normal.   Eyes:      Conjunctiva/sclera: Conjunctivae normal.   Neck:      Trachea: Trachea normal.   Cardiovascular:      Rate and Rhythm: Normal rate and regular rhythm.      Heart sounds: Normal heart sounds, S1 normal and S2 normal. No murmur heard.  Pulmonary:      Effort: Pulmonary effort is normal. No respiratory distress.      Breath sounds: Normal breath sounds. No wheezing or rales.   Chest:      Chest wall: No tenderness.   Abdominal:      General: Bowel sounds are normal. There is no distension.      Palpations: Abdomen is soft. There is no mass.      Tenderness: There is no abdominal tenderness. There is no guarding  or rebound.   Musculoskeletal:      Cervical back: Normal and neck supple.      Thoracic back: Normal.      Lumbar back: Spasms and tenderness present. No swelling or edema. Decreased range of motion. Positive right straight leg raise test. Negative left straight leg raise test.        Back:       Comments: LE: bilateral strength 5/5. Normal sensation to light touch. Normal EHL function. DP pulse 2+. Compartments soft. Skin intact.     Skin:     General: Skin is warm and dry.      Coloration: Skin is not pale.      Findings: No rash.   Neurological:      Mental Status: She is alert and oriented to person, place, and time.      Sensory: No sensory deficit.   Psychiatric:         Speech: Speech normal.         Behavior: Behavior normal. Behavior is cooperative.         Thought Content: Thought content normal.         Judgment: Judgment normal.           PROCEDURES     Procedures     DATA     Results     None          Imaging Results    None         No orders to display       Scoring tools                                  ED Course & MDM   MDM / ED COURSE / CLINICAL IMPRESSION / DISPO     Medical Decision Making  62 yo F with chronic back pain worsening over the past few weeks. Reported prior urinary incontinence but not present today. Otherwise no evidence of cauda equina syndrome on exam today. Ortho was consulted and the patient was cleared for discharge home. She improved with  meds in the emergency department .     Lumbar radiculopathy: chronic illness or injury with exacerbation, progression, or side effects of treatment  Amount and/or Complexity of Data Reviewed  External Data Reviewed: notes.  Radiology:  Decision-making details documented in ED Course.      Risk  Prescription drug management.          ED Course as of 01/02/24 1319   Tue Jan 02, 2024   0812 [facility] ortho resident. [SL]   1102 X-RAY LUMBAR SPINE 2 OR 3 VIEWS  IMPRESSION:  No traumatic malalignment or compression fracture in the lumbar spine. [SL]    1081 Spoke with ortho resident Talia Gauthier again. She reviewed case with Dr. Traore who reivewed the MRI and reported that it was without emergent findings. He will see patient in office this week. Continue medrol. [SL]      ED Course User Index  [SL] Lauren Zuniga PA C     Clinical Impression      Lumbar radiculopathy     _________________     ED Disposition   Discharge                   Lauren Zuniga PA C  01/02/24 2235

## 2024-01-02 NOTE — CONSULTS
Orthopedic Surgery Consult Note    Subjective     Anna Pruett is a 63 y.o. female s/p L5-S1 discectomy on 2023 by Dr. Traore presents to [facility] ER with complaints of increased back pain to the upper lumbar region radiating into bilateral lower extremities, R>L.  The pain is described as burning and nagging, exacerbated by going up stairs.  Patient states her pain began 2 weeks ago and presented to [facility] where she saw Dr. Hector.  An MRI was obtained however she has been unable to obtain the results.  She was prescribed a Medrol Dosepak and has taken it without significant pain relief.  She has 2 days left to complete.  Patient endorses urinary incontinence that began 2 weeks ago and has been constant.  She experienced previous urinary incontinence prior to her surgery.  She recently saw her PCP who prescribed tolterodine which has improved her urinary incontinence.  Denies fever, chills, bowel incontinence, saddle anesthesia, and numbness and tingling in the distal extremities.    Medical History:   Past Medical History:   Diagnosis Date   • Anxiety    • Back pain    • COVID-19    • Depression    • Hypertension    • Lipid disorder    • Panic attacks        Surgical History:   Past Surgical History:   Procedure Laterality Date   •  SECTION     • EPIDURAL STEROID INJECTION INTERLAMINAR THORACIC     • FRACTURE SURGERY Left     ankle ORIF   • ROTATOR CUFF REPAIR         Social History:   Social History     Social History Narrative   • Not on file       Family History:   Family History   Problem Relation Age of Onset   • Heart disease Biological Mother        Allergies: Trazodone and Tree nut    Current Inpatient Medications   Medication Dose Route Frequency Provider Last Rate Last Admin   • cyclobenzaprine (FLEXERIL) tablet 10 mg  10 mg oral Once Lauren Zuniga PA C       • ketorolac (TORADOL) injection 30 mg  30 mg intramuscular Once Lauren Zuniga PA C       • morphine concentrate 10  mg/0.5 mL concentrated solution 10 mg  10 mg oral Once Lauren Zuniga PA C       • predniSONE (DELTASONE) tablet 60 mg  60 mg oral Once Lauren Zuniga PA C            Medication List      ASK your doctor about these medications    amLODIPine 10 mg tablet  Commonly known as: NORVASC  Take 10 mg by mouth daily.  Dose: 10 mg     rosuvastatin 40 mg tablet  Commonly known as: CRESTOR  Take 40 mg by mouth daily.  Dose: 40 mg          Review of Systems  See HPI    Objective   Labs  CBC Results       03/11/23 03/06/23     1118 1106    WBC 9.29 4.50    RBC 4.26 4.28    HGB 12.2 12.2    HCT 37.6 37.9    MCV 88.3 88.6    MCH 28.6 28.5    MCHC 32.4 32.2     294         BMP Results       03/11/23 03/06/23     1118 1106     140    K 3.8 4.4    Cl 112 108    CO2 25 24    Glucose 87 85    BUN 10 9    Creatinine 0.6 0.7    Calcium 8.7 8.7    Anion Gap 4 8    EGFR >60.0 >60.0         Comment for K at 1118 on 03/11/23: Results obtained on plasma. Plasma Potassium values may be up to 0.4 mEQ/L less than serum values. The differences may be greater for patients with high platelet or white cell counts.             Imaging  X-ray lumbar spine: No acute fracture or dislocations      Physical Exam  Temp:  [36.9 °C (98.4 °F)] 36.9 °C (98.4 °F)  Heart Rate:  [86] 86  Resp:  [18] 18  BP: (119)/(81) 119/81  SpO2:  [98 %] 98 %     General: AVSS, NAD  Head: NC/AT  Resp: no labored breathing  Neuro: awake, alert  MSK:     C-T-L Spine:  -No palpable step-offs or masses  -No TTP paraspinally or over SP in the cervical or thoracic regions, +TTP over right lower lumbar region  -Full painless cervical ROM  -Motor strength 5/5 B/L UE and LE except for 4/5 R L4    RUE:  -Skin intact, no abrasions, no lacerations   -Compartments soft and compressible  -No TTP of bony prominences  -No pain or crepitus with ROM of shoulder/elbow/wrist/fingers  -No palpable defect to Fingers/Hand/Wrist/Forearm/Elbow/Humerus/Shoulder/Clavicle  -SILT  median/ulnar/radial  -Motor intact to axillary/musculocutaneous/radial/ulnar/median/AIN/PIN  -Radial Pulse 2+  -Hand wwp, BCR    LUE:  -Skin intact, no abrasions, no lacerations   -Compartments soft and compressible  -No TTP of bony prominences  -No pain or crepitus with ROM of shoulder/elbow/wrist/fingers  -No palpable defect to Fingers/Hand/Wrist/Forearm/Elbow/Humerus/Shoulder/Clavicle  -SILT median/ulnar/radial  -Motor intact to axillary/musculocutaneous/radial/ulnar/median/AIN/PIN  -Radial Pulse 2+  -Hand wwp, BCR    RLE:  -Skin intact, no abrasions, no lacerations   -Compartments soft and compressible  -No TTP of bony prominences  -No pain or crepitus with ROM of hip/knee/ankle/toes  -No palpable defects to toes/foot/ankle/tibia/knee/femur  -Negative Logroll  -Able to SLR  -No pain with Axial load  -No pain with axial compression of flexed hip and knee  -No pain with compression at ASIS  -SILT in sural/saphenous/DP/SP/tibial distributions  -Motor intact to quad/hamstrings/EHL/TA/peroneals/GSC  -DP Pulse 2+  -3+ patellar tendon DTR, 2+ Achilles tendon DTR  -Foot wwp, BCR    LLE:  -Skin intact, no abrasions, no lacerations   -Compartments soft and compressible  -No TTP of bony prominences  -No pain or crepitus with ROM of hip/knee/ankle/toes  -No palpable defects to toes/foot/ankle/tibia/knee/femur  -Negative Logroll  -Able to SLR  -No pain with Axial load  -No pain with axial compression of flexed hip and knee  -No pain with compression at ASIS  -SILT in sural/saphenous/DP/SP/tibial distributions  -Motor intact to quad/hamstrings/EHL/TA/peroneals/GSC  -DP Pulse 2+  -2+ patellar and Achilles tendon DTR  -Foot wwp, BCR      Assessment   63 y.o. female s/p L5-S1 right discectomy in March 2023 by Dr. Traore presenting with increased pain with urinary incontinence x 2 weeks, recently seen outpatient for similar complaints and prescribed Medrol Dosepak, MRI obtained at that time.  No acute orthopedic surgical  intervention indicated at this time       Plan   -No orthopedic surgical intervention indicated at this time   -Recommend PT/OT  -Multimodal pain control and lidocaine patches as needed  -Weightbearing as tolerated bilateral lower extremities  -Recommend close outpatient monitoring  -Follow up outpatient with Dr. Traore Wednesday 1/3 or Friday 1/5   -Discussed with Dr. Traore who agrees with plan    Talia Gauthier DO   Orthopedic Surgery Resident

## 2024-06-02 ENCOUNTER — APPOINTMENT (EMERGENCY)
Dept: RADIOLOGY | Facility: HOSPITAL | Age: 64
End: 2024-06-02
Attending: STUDENT IN AN ORGANIZED HEALTH CARE EDUCATION/TRAINING PROGRAM
Payer: COMMERCIAL

## 2024-06-02 ENCOUNTER — HOSPITAL ENCOUNTER (EMERGENCY)
Facility: HOSPITAL | Age: 64
Discharge: HOME | End: 2024-06-02
Attending: EMERGENCY MEDICINE
Payer: COMMERCIAL

## 2024-06-02 VITALS
DIASTOLIC BLOOD PRESSURE: 81 MMHG | RESPIRATION RATE: 16 BRPM | HEART RATE: 77 BPM | BODY MASS INDEX: 27.6 KG/M2 | OXYGEN SATURATION: 99 % | TEMPERATURE: 97.8 F | SYSTOLIC BLOOD PRESSURE: 121 MMHG | WEIGHT: 150 LBS | HEIGHT: 62 IN
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
## 2024-06-02 DIAGNOSIS — M79.652 PAIN OF LEFT THIGH: ICD-10-CM

## 2024-06-02 PROCEDURE — 99283 EMERGENCY DEPT VISIT LOW MDM: CPT | Mod: U5,25

## 2024-06-02 PROCEDURE — 96372 THER/PROPH/DIAG INJ SC/IM: CPT

## 2024-06-02 PROCEDURE — 3E0133Z INTRODUCTION OF ANTI-INFLAMMATORY INTO SUBCUTANEOUS TISSUE, PERCUTANEOUS APPROACH: ICD-10-PCS | Performed by: EMERGENCY MEDICINE

## 2024-06-02 PROCEDURE — 73502 X-RAY EXAM HIP UNI 2-3 VIEWS: CPT | Mod: LT

## 2024-06-02 PROCEDURE — 63700000 HC SELF-ADMINISTRABLE DRUG: Performed by: STUDENT IN AN ORGANIZED HEALTH CARE EDUCATION/TRAINING PROGRAM

## 2024-06-02 PROCEDURE — 63600000 HC DRUGS/DETAIL CODE: Performed by: STUDENT IN AN ORGANIZED HEALTH CARE EDUCATION/TRAINING PROGRAM

## 2024-06-02 RX ORDER — LIDOCAINE 560 MG/1
1 PATCH PERCUTANEOUS; TOPICAL; TRANSDERMAL ONCE
Status: DISCONTINUED | OUTPATIENT
Start: 2024-06-02 | End: 2024-06-02 | Stop reason: HOSPADM

## 2024-06-02 RX ORDER — LIDOCAINE 50 MG/G
1 PATCH TOPICAL DAILY
Qty: 30 PATCH | Refills: 0 | Status: SHIPPED | OUTPATIENT
Start: 2024-06-02 | End: 2024-07-02

## 2024-06-02 RX ORDER — KETOROLAC TROMETHAMINE 15 MG/ML
15 INJECTION, SOLUTION INTRAMUSCULAR; INTRAVENOUS ONCE
Status: COMPLETED | OUTPATIENT
Start: 2024-06-02 | End: 2024-06-02

## 2024-06-02 RX ADMIN — LIDOCAINE 1 PATCH: 4 PATCH TOPICAL at 11:52

## 2024-06-02 RX ADMIN — KETOROLAC TROMETHAMINE 15 MG: 15 INJECTION, SOLUTION INTRAMUSCULAR; INTRAVENOUS at 11:52

## 2024-06-02 NOTE — Clinical Note
Lashae Pruett was seen and treated in our emergency department on 6/2/2024.  Lashae Pruett may return to work on 06/04/2024.       If you have any questions or concerns, please don't hesitate to call.      Gwyn Minor MD

## 2024-06-02 NOTE — ED PROVIDER NOTES
Emergency Medicine Note  HPI   HISTORY OF PRESENT ILLNESS     64-year-old female with history of hypertension and hyperlipidemia presenting after a mechanical fall .  Patient reports she was mopping the floor when she slipped on water and did the splits.  Subsequently fell onto her buttock.  She denies head strike or loss of consciousness.  Not on blood thinners.  Has been experiencing left posterior thigh pain since the fall.  Has been taking Advil for pain with no improvement to her pain so she came to the ED for further evaluation and management.  No other pain at this time.          Patient History   PAST HISTORY     Reviewed from Nursing Triage:       Past Medical History:   Diagnosis Date    Anxiety     Back pain     COVID-19     Depression     Hypertension     Lipid disorder     Panic attacks        Past Surgical History:   Procedure Laterality Date     SECTION      EPIDURAL STEROID INJECTION INTERLAMINAR THORACIC      FRACTURE SURGERY Left     ankle ORIF    ROTATOR CUFF REPAIR         Family History   Problem Relation Age of Onset    Heart disease Biological Mother        Social History     Tobacco Use    Smoking status: Every Day     Packs/day: .5     Types: Cigarettes    Smokeless tobacco: Never   Vaping Use    Vaping Use: Never used   Substance Use Topics    Alcohol use: Not Currently    Drug use: Never         Review of Systems   REVIEW OF SYSTEMS     Review of Systems      VITALS     ED Vitals      Date/Time Temp Pulse Resp BP SpO2 [facility]   24 1103 36.6 °C (97.8 °F) 77 16 121/81 99 % KP                         Physical Exam   PHYSICAL EXAM     Physical Exam  Vitals and nursing note reviewed.   Constitutional:       General: She is not in acute distress.     Appearance: Normal appearance. She is well-developed. She is not ill-appearing, toxic-appearing or diaphoretic.   HENT:      Head: Normocephalic and atraumatic.      Nose: No congestion or rhinorrhea.      Mouth/Throat:      Mouth:  Mucous membranes are moist.   Eyes:      Extraocular Movements: Extraocular movements intact.      Conjunctiva/sclera: Conjunctivae normal.      Pupils: Pupils are equal, round, and reactive to light.   Cardiovascular:      Rate and Rhythm: Normal rate and regular rhythm.      Pulses: Normal pulses.      Heart sounds: Normal heart sounds. No murmur heard.  Pulmonary:      Effort: Pulmonary effort is normal. No respiratory distress.      Breath sounds: Normal breath sounds.   Abdominal:      General: There is no distension.      Palpations: Abdomen is soft.      Tenderness: There is no abdominal tenderness. There is no guarding or rebound.   Musculoskeletal:         General: Tenderness (Left anterior hip) present. Normal range of motion.      Cervical back: Normal range of motion and neck supple. No tenderness.      Right lower leg: No edema.      Left lower leg: No edema.   Skin:     General: Skin is warm and dry.      Capillary Refill: Capillary refill takes less than 2 seconds.   Neurological:      General: No focal deficit present.      Mental Status: She is alert and oriented to person, place, and time.      Sensory: No sensory deficit.      Motor: No weakness.   Psychiatric:         Mood and Affect: Mood normal.         Behavior: Behavior normal.           PROCEDURES     Procedures     DATA     Results       None            Imaging Results              X-RAY HIP WITH OR WITHOUT PELVIS 2-3 VW LEFT (Final result)  Result time 06/02/24 12:23:16      Final result                   Impression:    IMPRESSION:  No fracture left hip               Narrative:    CLINICAL HISTORY:  Trauma    COMMENT:  AP view of the pelvis and AP, frog lateral view of the left hip demonstrate the  visualized bones and soft tissues to be normal in appearance. There is no  evidence for degenerative changes. There is no evidence for fracture or bone  destruction.                                      No orders to display       Scoring tools                                   ED Course & MDM   MDM / ED COURSE / CLINICAL IMPRESSION / DISPO     Medical Decision Making  64 year old female presenting after mechanical fall. XR left hip with no acute fractures. No other traumatic injuries noted on physical exam requiring further imaging. Pain improved with IM Toradol and lidocaine patch. She is otherwise well appearing and stable for discharge. She is agreeable with outpatient management and given strict return precautions.     Amount and/or Complexity of Data Reviewed  Radiology: ordered. Decision-making details documented in ED Course.    Risk  OTC drugs.  Prescription drug management.        ED Course as of 06/02/24 1338   Sun Jun 02, 2024   1229 X-RAY HIP WITH OR WITHOUT PELVIS 2-3 VW LEFT  IMPRESSION:  No fracture left hip   [TS]   1237 Patient reports improvement to pain. Agreeable with outpatient management and given strict return precautions.  [TS]      ED Course User Index  [TS] Gwyn Minor MD     Clinical Impression      Fall, initial encounter   Pain of left thigh     _________________       ED Disposition   Discharge                       Gwyn Minor MD  Resident  06/02/24 1336

## 2024-06-02 NOTE — DISCHARGE INSTRUCTIONS
You were seen in the emergency department with left thigh pain after a fall at home. You had an xray done that shows no broken bones.     Continue to take Tylenol and Motrin for pain at home. You can also use lidocaine patches for pain. Follow up with your primary care provider for further evaluation and management as needed.     Come back to the emergency department if your pain gets worse and you are not able to get your pain under control at home.

## 2025-04-25 ENCOUNTER — TRANSCRIBE ORDERS (OUTPATIENT)
Dept: SCHEDULING | Age: 65
End: 2025-04-25

## 2025-04-25 DIAGNOSIS — M23.91 DERANGEMENT OF COLLATERAL LIGAMENT OF RIGHT KNEE: Primary | ICD-10-CM

## 2025-04-29 ENCOUNTER — HOSPITAL ENCOUNTER (OUTPATIENT)
Dept: RADIOLOGY | Age: 65
Discharge: HOME | End: 2025-04-29
Attending: ORTHOPAEDIC SURGERY
Payer: COMMERCIAL

## 2025-04-29 DIAGNOSIS — M23.91 DERANGEMENT OF COLLATERAL LIGAMENT OF RIGHT KNEE: ICD-10-CM

## 2025-07-26 ENCOUNTER — HOSPITAL ENCOUNTER (EMERGENCY)
Facility: HOSPITAL | Age: 65
Discharge: HOME | End: 2025-07-26
Attending: EMERGENCY MEDICINE
Payer: MEDICARE

## 2025-07-26 ENCOUNTER — APPOINTMENT (EMERGENCY)
Dept: RADIOLOGY | Facility: HOSPITAL | Age: 65
End: 2025-07-26
Payer: MEDICARE

## 2025-07-26 VITALS
BODY MASS INDEX: 28.71 KG/M2 | HEART RATE: 100 BPM | OXYGEN SATURATION: 100 % | RESPIRATION RATE: 17 BRPM | TEMPERATURE: 98 F | WEIGHT: 156 LBS | SYSTOLIC BLOOD PRESSURE: 155 MMHG | DIASTOLIC BLOOD PRESSURE: 97 MMHG | HEIGHT: 62 IN

## 2025-07-26 DIAGNOSIS — M54.50 ACUTE LOW BACK PAIN, UNSPECIFIED BACK PAIN LATERALITY, UNSPECIFIED WHETHER SCIATICA PRESENT: Primary | ICD-10-CM

## 2025-07-26 PROCEDURE — 72100 X-RAY EXAM L-S SPINE 2/3 VWS: CPT

## 2025-07-26 PROCEDURE — 99283 EMERGENCY DEPT VISIT LOW MDM: CPT

## 2025-07-26 PROCEDURE — 63700000 HC SELF-ADMINISTRABLE DRUG

## 2025-07-26 RX ORDER — CYCLOBENZAPRINE HCL 10 MG
10 TABLET ORAL 3 TIMES DAILY PRN
Qty: 12 TABLET | Refills: 0 | Status: SHIPPED | OUTPATIENT
Start: 2025-07-26

## 2025-07-26 RX ORDER — OXYCODONE AND ACETAMINOPHEN 5; 325 MG/1; MG/1
1 TABLET ORAL EVERY 6 HOURS PRN
Qty: 12 TABLET | Refills: 0 | Status: SHIPPED | OUTPATIENT
Start: 2025-07-26 | End: 2025-07-29

## 2025-07-26 RX ORDER — CYCLOBENZAPRINE HCL 10 MG
10 TABLET ORAL ONCE
Status: COMPLETED | OUTPATIENT
Start: 2025-07-26 | End: 2025-07-26

## 2025-07-26 RX ORDER — ACETAMINOPHEN 325 MG/1
650 TABLET ORAL ONCE
Status: COMPLETED | OUTPATIENT
Start: 2025-07-26 | End: 2025-07-26

## 2025-07-26 RX ORDER — OXYCODONE HYDROCHLORIDE 5 MG/1
5 TABLET ORAL ONCE
Refills: 0 | Status: COMPLETED | OUTPATIENT
Start: 2025-07-26 | End: 2025-07-26

## 2025-07-26 RX ORDER — IBUPROFEN 600 MG/1
600 TABLET, FILM COATED ORAL ONCE
Status: COMPLETED | OUTPATIENT
Start: 2025-07-26 | End: 2025-07-26

## 2025-07-26 RX ORDER — PREDNISONE 10 MG/1
TABLET ORAL
Qty: 24 TABLET | Refills: 0 | Status: SHIPPED | OUTPATIENT
Start: 2025-07-26

## 2025-07-26 RX ADMIN — PREDNISONE 60 MG: 50 TABLET ORAL at 10:33

## 2025-07-26 RX ADMIN — IBUPROFEN 600 MG: 600 TABLET ORAL at 10:33

## 2025-07-26 RX ADMIN — OXYCODONE HYDROCHLORIDE 5 MG: 5 TABLET ORAL at 10:33

## 2025-07-26 RX ADMIN — CYCLOBENZAPRINE 10 MG: 10 TABLET, FILM COATED ORAL at 10:33

## 2025-07-26 RX ADMIN — ACETAMINOPHEN 650 MG: 325 TABLET ORAL at 10:33

## 2025-07-26 ASSESSMENT — ENCOUNTER SYMPTOMS
AGITATION: 0
CHILLS: 0
SHORTNESS OF BREATH: 0
NAUSEA: 0
ABDOMINAL PAIN: 0
WEAKNESS: 0
DIFFICULTY URINATING: 0
VOMITING: 0
DIARRHEA: 0
NECK PAIN: 0
DYSURIA: 0
COLOR CHANGE: 0
CONFUSION: 0
HEADACHES: 0
ACTIVITY CHANGE: 0
BACK PAIN: 1
FEVER: 0

## 2025-07-26 NOTE — ED PROVIDER NOTES
HPI    Chief Complaint   Patient presents with    Back Pain    Knee Pain        HPI   65 year old female with PMH listed below including recent right torn meniscus and previous L5-S1 discectomy in  presents for evaluation of right knee pain and back pain.  She notes knee pain consistent for the last 8 months.  Notes it's making her walk differently which has caused back pain to flare up. Recently finished medrol dosepak and had percocet filled by Dr. Smith. She denies loss of bowel or bladder function, saddle anesthesia, loss of sensation, leg weakness, direct trauma to back and knee.  Has not had headaches or fevers.      Past Medical History:   Diagnosis Date    Anxiety     Back pain     COVID-19     Depression     Hypertension     Lipid disorder     Panic attacks          Past Surgical History   Procedure Laterality Date     section      Epidural steroid injection interlaminar thoracic      Fracture surgery Left     ankle ORIF    RIGHT L5-S1 DISCECTOMY Right 3/9/2023    Performed by Lyle Traore Jr., MD at  [facility]    Rotator cuff repair         Family History   Problem Relation Name Age of Onset    Heart disease Biological Mother         Social History     Tobacco Use    Smoking status: Every Day     Current packs/day: 0.50     Types: Cigarettes    Smokeless tobacco: Never   Vaping Use    Vaping status: Never Used   Substance Use Topics    Alcohol use: Not Currently    Drug use: Never       Systems Reviewed from Nursing Triage:               Review of Systems   Constitutional:  Negative for activity change, chills and fever.   Respiratory:  Negative for shortness of breath.    Cardiovascular:  Negative for chest pain and leg swelling.   Gastrointestinal:  Negative for abdominal pain, diarrhea, nausea and vomiting.   Genitourinary:  Negative for difficulty urinating and dysuria.   Musculoskeletal:  Positive for back pain. Negative for neck pain.        Right knee pain   Skin:  Negative for  "color change.   Neurological:  Negative for syncope, weakness and headaches.   Psychiatric/Behavioral:  Negative for agitation, behavioral problems and confusion.             ED Triage Vitals [07/26/25 1016]   Temp Heart Rate Resp BP SpO2   36.7 °C (98 °F) 100 17 (!) 155/97 100 %      Temp Source Heart Rate Source Patient Position BP Location FiO2 (%) (Set)   Temporal -- -- -- --       Vitals:    07/26/25 1016   BP: (!) 155/97   Pulse: 100   Resp: 17   Temp: 36.7 °C (98 °F)   TempSrc: Temporal   SpO2: 100%   Weight: 70.8 kg (156 lb)   Height: 1.575 m (5' 2\")                         Physical Exam  Vitals and nursing note reviewed.   Constitutional:       General: She is not in acute distress.     Appearance: She is well-developed. She is not ill-appearing.   HENT:      Head: Normocephalic and atraumatic.   Eyes:      Conjunctiva/sclera: Conjunctivae normal.      Pupils: Pupils are equal, round, and reactive to light.   Cardiovascular:      Rate and Rhythm: Normal rate.      Pulses: Normal pulses.   Pulmonary:      Effort: Pulmonary effort is normal.   Musculoskeletal:         General: No deformity. Normal range of motion.      Cervical back: Normal and normal range of motion.      Thoracic back: Normal.      Lumbar back: Tenderness and bony tenderness present. No spasms. Negative right straight leg raise test and negative left straight leg raise test.   Skin:     General: Skin is warm and dry.      Capillary Refill: Capillary refill takes less than 2 seconds.   Neurological:      Mental Status: She is alert and oriented to person, place, and time. Mental status is at baseline.   Psychiatric:         Behavior: Behavior normal.              X-RAY LUMBAR SPINE 2 OR 3 VIEWS   Final Result   IMPRESSION:      As above          Labs Reviewed - No data to display    X-RAY LUMBAR SPINE 2 OR 3 VIEWS   Final Result   IMPRESSION:      As above            Procedures    Final diagnoses:   [M54.50] Acute low back pain, unspecified " back pain laterality, unspecified whether sciatica present       ED Course & MDM     ED Course as of 07/26/25 1604   Sat Jul 26, 2025   1212 Xray shows no acute findings, fracture, or malalignment. Patient ambulating with a steady gait. Advised follow-up with Dr. Guerrero who did her previous surgery.  Also gave her back pain specialist to follow with if unable to see ortho in the short term.  Patient understanding and agreeable with the plan. [SW]      ED Course User Index  [SW] Juliana Sánchez CRNP           Medical Decision Making  Problems Addressed:  Acute low back pain, unspecified back pain laterality, unspecified whether sciatica present: acute illness or injury    Amount and/or Complexity of Data Reviewed  External Data Reviewed: labs, radiology and notes.  Radiology: ordered. Decision-making details documented in ED Course.    Risk  OTC drugs.  Prescription drug management.        4:04 PM      Impression/Medical Decision Making: knee pain x months, back pain x weeks    Plan: imaging, back pain protocol, observe    Vital Signs Review: Vital signs have been reviewed. The oxygen saturation is  SpO2: 100 % which is  normal.      NGUYỄN Ventura  7/26/2025      We are in a period of time with increased volumes and decreased capacity.     This document was created using dragon dictation software.  There might be some typographical errors due to this technology.       Juliana Sánchez CRNP  07/26/25 1600

## 2025-07-26 NOTE — DISCHARGE INSTRUCTIONS
You can take 400-600mg of ibuprofen every 6-8 hours as needed for pain.  Please take this medication with food.

## 2025-07-30 NOTE — ED ATTESTATION NOTE
I have personally seen and examined Lashae Pruett.  I was involved in the care and medical decision making for this patient.    I reviewed and agree with physician assistant / nurse practitioner’s assessment and plan of care; any exceptions are documented below.      My focused history, examination, assessment and plan of care of Lashae Pruett is as follows:    Brief History:  HPI  66 yo F with hx of Rt meniscal injury, HTN, chronic back pain p/w increased  lower back pain over the past several days. She thinks it was triggered by her walking with antalgic gait from chronic right knee pain. No leg numbness. No fevers. No falls/ trauma. Rt knee pain is at baseline.       Focused Physical Exam:  Physical Exam  Vitals and nursing note reviewed.   Constitutional:       General: She is in acute distress.      Appearance: She is normal weight. She is not toxic-appearing.   HENT:      Head: Normocephalic and atraumatic.      Nose: Nose normal.      Mouth/Throat:      Mouth: Mucous membranes are moist.   Cardiovascular:      Rate and Rhythm: Normal rate.      Pulses: Normal pulses.      Heart sounds: Normal heart sounds.   Pulmonary:      Effort: Pulmonary effort is normal.      Breath sounds: Normal breath sounds.   Abdominal:      Palpations: Abdomen is soft.      Tenderness: There is no abdominal tenderness. There is no guarding or rebound.   Musculoskeletal:         General: Tenderness present. No deformity.      Cervical back: Normal range of motion and neck supple.      Comments: + BL lumbosacral TTP, no midline TTP or stepofff.    Skin:     General: Skin is warm and dry.      Capillary Refill: Capillary refill takes less than 2 seconds.      Findings: No rash.   Neurological:      General: No focal deficit present.      Mental Status: She is alert and oriented to person, place, and time. Mental status is at baseline.      Cranial Nerves: No cranial nerve deficit.      Sensory: No sensory deficit.      Motor: No  weakness.      Gait: Gait normal.      Comments: No saddle anesthesia, steady gait.   Psychiatric:         Mood and Affect: Mood normal.         Behavior: Behavior normal.             Assessment / Plan:        Medical Decision Making  Suspect muscle/ lumbosacral strain, possible lumbar radiculopathy. Much less likely cauda equina, pathological fracture. Has nonfocal neuro exam here. Steady gait. Will check l-spine xray,provide pain control. Offered to check post-void residual since pt reports having an episode of urinary incontinence recently to r/o UTI or urinary retention but she declines and prefers to f/u closely with her PCP and spine surgeon.     Amount and/or Complexity of Data Reviewed  Radiology: ordered.    Risk  OTC drugs.  Prescription drug management.        I was physically present for the key/critical portions of the following procedures:  Procedures           Tima Carrasquillo MD  07/30/25 2363

## (undated) DEVICE — SUTURE MONOCRYL 2-0 Y417 SH UNDYED

## (undated) DEVICE — SLEEVE SCD COMFORT KNEE LENGTH MED

## (undated) DEVICE — DRAPE LARGE REVERSE FOLD

## (undated) DEVICE — SUTURE POLYSORB 1 UNDYED 1X30 GS-13

## (undated) DEVICE — BINDER ABDOMINAL ONE SIZE 10 HIGH 27-48

## (undated) DEVICE — DRAPE C-ARM X-RAY EQUIPMENT IMAGE

## (undated) DEVICE — CORD BI-POLAR DISP STERILE

## (undated) DEVICE — TOWEL SURGICAL W17XL27IN BLUE COTTON STANDARD PREWASHED DELI

## (undated) DEVICE — PENEVAC1 NONSTICK SMOKE EVAC

## (undated) DEVICE — SPONGE LAP 4X18 SAFE-T RFID ENHANCED XRAY

## (undated) DEVICE — CLOTH PREPPING SAGE 2% CHG 2/PK

## (undated) DEVICE — ADHESIVE SKIN DERMABOND ADVANCED 0.7ML

## (undated) DEVICE — MANIFOLD SINGLE PORT NEPTUNE

## (undated) DEVICE — BLANKET LOWER BODY

## (undated) DEVICE — SPONGE SURGIFOAM ABSORBABLE GELATIN 100 8.5CM X 12CM X 10MM

## (undated) DEVICE — DRAPE IOBAN

## (undated) DEVICE — GOWN SURG X-LARGE MICROCOOL

## (undated) DEVICE — CONTAINER SPECIMEN STERILE 5OZ

## (undated) DEVICE — DRAPE STERI TOWEL PLASTIC 18X24

## (undated) DEVICE — DRESSING MEPILEX 2X5 BORDER LITE

## (undated) DEVICE — SOLN IRRIG STER WATER 1000ML

## (undated) DEVICE — SUTURE POLYSORB 0 UNDYED 1X30 GS-11

## (undated) DEVICE — NEEDLE DISP HYPO 22GX1-1/2IN

## (undated) DEVICE — PACK RFID LAMINECTOMY PMH

## (undated) DEVICE — DRESSING MEPILEX 6X6 BORDER

## (undated) DEVICE — SOLN DURAPREP WAND

## (undated) DEVICE — SUTURE MONOCRYL 4-0 Y426H PS-2 27IN

## (undated) DEVICE — PAD GROUND ELECTROSURGICAL W/CORD